# Patient Record
Sex: FEMALE | Race: WHITE | NOT HISPANIC OR LATINO | Employment: OTHER | URBAN - METROPOLITAN AREA
[De-identification: names, ages, dates, MRNs, and addresses within clinical notes are randomized per-mention and may not be internally consistent; named-entity substitution may affect disease eponyms.]

---

## 2019-10-12 ENCOUNTER — HOSPITAL ENCOUNTER (INPATIENT)
Facility: HOSPITAL | Age: 59
LOS: 4 days | Discharge: NURSING, REHAB OR LONG-TERM CARE | DRG: 064 | End: 2019-10-16
Attending: PSYCHIATRY & NEUROLOGY | Admitting: PSYCHIATRY & NEUROLOGY
Payer: COMMERCIAL

## 2019-10-12 ENCOUNTER — HOSPITAL ENCOUNTER (OUTPATIENT)
Dept: TELEMEDICINE | Facility: HOSPITAL | Age: 59
Discharge: HOME | End: 2019-10-12
Payer: COMMERCIAL

## 2019-10-12 DIAGNOSIS — I63.411 EMBOLIC STROKE INVOLVING RIGHT MIDDLE CEREBRAL ARTERY: ICD-10-CM

## 2019-10-12 DIAGNOSIS — I65.23 INTERNAL CAROTID ARTERY STENOSIS, BILATERAL: ICD-10-CM

## 2019-10-12 DIAGNOSIS — I10 ESSENTIAL HYPERTENSION: ICD-10-CM

## 2019-10-12 DIAGNOSIS — I65.23 BILATERAL CAROTID ARTERY STENOSIS: ICD-10-CM

## 2019-10-12 DIAGNOSIS — I63.9 STROKE: ICD-10-CM

## 2019-10-12 PROBLEM — G93.6 CYTOTOXIC CEREBRAL EDEMA: Status: ACTIVE | Noted: 2019-10-12

## 2019-10-12 PROBLEM — I65.21 INTERNAL CAROTID ARTERY STENOSIS, RIGHT: Status: ACTIVE | Noted: 2019-10-12

## 2019-10-12 PROBLEM — E78.2 MIXED HYPERLIPIDEMIA: Status: ACTIVE | Noted: 2019-10-12

## 2019-10-12 LAB
CHOLEST SERPL-MCNC: 143 MG/DL (ref 120–199)
CHOLEST/HDLC SERPL: 3.7 {RATIO} (ref 2–5)
ESTIMATED AVG GLUCOSE: 192 MG/DL (ref 68–131)
HBA1C MFR BLD HPLC: 8.3 % (ref 4–5.6)
HDLC SERPL-MCNC: 39 MG/DL (ref 40–75)
HDLC SERPL: 27.3 % (ref 20–50)
LDLC SERPL CALC-MCNC: 71 MG/DL (ref 63–159)
NONHDLC SERPL-MCNC: 104 MG/DL
POCT GLUCOSE: 130 MG/DL (ref 70–110)
TRIGL SERPL-MCNC: 165 MG/DL (ref 30–150)
TSH SERPL DL<=0.005 MIU/L-ACNC: 1.36 UIU/ML (ref 0.4–4)

## 2019-10-12 PROCEDURE — 99285 EMERGENCY DEPT VISIT HI MDM: CPT | Mod: 25

## 2019-10-12 PROCEDURE — G0425 INPT/ED TELECONSULT30: HCPCS | Mod: GT,G0,, | Performed by: PSYCHIATRY & NEUROLOGY

## 2019-10-12 PROCEDURE — 82962 GLUCOSE BLOOD TEST: CPT

## 2019-10-12 PROCEDURE — 84443 ASSAY THYROID STIM HORMONE: CPT

## 2019-10-12 PROCEDURE — 99285 EMERGENCY DEPT VISIT HI MDM: CPT | Mod: ,,, | Performed by: EMERGENCY MEDICINE

## 2019-10-12 PROCEDURE — 83036 HEMOGLOBIN GLYCOSYLATED A1C: CPT

## 2019-10-12 PROCEDURE — G0425 PR INPT TELEHEALTH CONSULT 30M: ICD-10-PCS | Mod: GT,G0,, | Performed by: PSYCHIATRY & NEUROLOGY

## 2019-10-12 PROCEDURE — 80061 LIPID PANEL: CPT

## 2019-10-12 PROCEDURE — 81001 URINALYSIS AUTO W/SCOPE: CPT

## 2019-10-12 PROCEDURE — 20600001 HC STEP DOWN PRIVATE ROOM

## 2019-10-12 PROCEDURE — 99285 PR EMERGENCY DEPT VISIT,LEVEL V: ICD-10-PCS | Mod: ,,, | Performed by: EMERGENCY MEDICINE

## 2019-10-12 RX ORDER — CLOPIDOGREL BISULFATE 75 MG/1
75 TABLET ORAL DAILY
Status: DISCONTINUED | OUTPATIENT
Start: 2019-10-13 | End: 2019-10-16 | Stop reason: HOSPADM

## 2019-10-12 RX ORDER — HEPARIN SODIUM 5000 [USP'U]/ML
5000 INJECTION, SOLUTION INTRAVENOUS; SUBCUTANEOUS EVERY 8 HOURS
Status: DISCONTINUED | OUTPATIENT
Start: 2019-10-13 | End: 2019-10-16 | Stop reason: HOSPADM

## 2019-10-12 RX ORDER — ASPIRIN 81 MG/1
81 TABLET ORAL DAILY
Status: DISCONTINUED | OUTPATIENT
Start: 2019-10-13 | End: 2019-10-16 | Stop reason: HOSPADM

## 2019-10-12 RX ORDER — SODIUM CHLORIDE 0.9 % (FLUSH) 0.9 %
10 SYRINGE (ML) INJECTION
Status: DISCONTINUED | OUTPATIENT
Start: 2019-10-12 | End: 2019-10-16 | Stop reason: HOSPADM

## 2019-10-12 RX ORDER — ATORVASTATIN CALCIUM 20 MG/1
40 TABLET, FILM COATED ORAL DAILY
Status: DISCONTINUED | OUTPATIENT
Start: 2019-10-13 | End: 2019-10-16 | Stop reason: HOSPADM

## 2019-10-12 RX ORDER — LABETALOL HCL 20 MG/4 ML
10 SYRINGE (ML) INTRAVENOUS
Status: DISCONTINUED | OUTPATIENT
Start: 2019-10-12 | End: 2019-10-16 | Stop reason: HOSPADM

## 2019-10-12 NOTE — SUBJECTIVE & OBJECTIVE
Woke up with symptoms?: no    Recent bleeding noted: no  Does the patient take any Blood Thinners? no  Medications: Antiplatelets:  aspirin      Past Medical History: hypertension, MI/CAD, CHF and asthma    Past Surgical History: no major surgeries within the last 2 weeks    Family History: no relevant history    Social History: no smoking, no drinking, no drugs    Allergies: Allergies have not been reviewed No known drug allergies    Review of Systems   Constitutional: Negative for chills, diaphoresis and fever.   HENT: Negative for hearing loss, tinnitus and trouble swallowing.    Eyes: Negative for visual disturbance.   Respiratory: Negative for shortness of breath.    Cardiovascular: Negative for chest pain and palpitations.   Gastrointestinal: Negative for vomiting.   Endocrine: Negative for cold intolerance.   Genitourinary: Negative for hematuria.   Musculoskeletal: Negative for neck pain and neck stiffness.   Allergic/Immunologic: Negative for immunocompromised state.   Neurological: Positive for facial asymmetry, speech difficulty, weakness and numbness. Negative for dizziness and headaches.   Psychiatric/Behavioral: Negative for agitation, behavioral problems and confusion.     Objective:   Vitals: There were no vitals taken for this visit. BP: 139/70, Respiratory Rate: 17 and Heart Rate: 78    CT READ: Yes  No hemmorhage. No mass effect. No early infarct signs.     Physical Exam   Constitutional: She is oriented to person, place, and time. She appears well-developed and well-nourished.   HENT:   Head: Normocephalic and atraumatic.   Eyes: Pupils are equal, round, and reactive to light.   R gaze     Neck: Normal range of motion. Neck supple.   Cardiovascular: Normal rate and regular rhythm.   Pulmonary/Chest: No respiratory distress.   Abdominal: She exhibits no mass.   Genitourinary: Rectal exam shows guaiac negative stool.   Musculoskeletal: She exhibits no edema or deformity.   Neurological: She is  alert and oriented to person, place, and time. A cranial nerve deficit and sensory deficit is present. She exhibits abnormal muscle tone.   Skin: No rash noted. She is not diaphoretic. No erythema.   Psychiatric: She has a normal mood and affect. Her behavior is normal.   Nursing note and vitals reviewed.

## 2019-10-12 NOTE — CONSULTS
[facility]  Vascular Neurology  Comprehensive Stroke Center  Tele-Consultation Note      Consults    Consulting Provider: ED MOON  Current Providers  No providers found    Patient Location: [facility] Emergency Department  Spoke hospital nurse at bedside with patient assisting consultant.     Patient information was obtained from patient, spouse/SO and ED MD.         Assessment/Plan:    60 y/o with HTN, CAD, CHF, asthma, presents with acute onset L sided weakness, Lface droop, slurred speech, and R gaze.  NIHSS 13, CTH without acute abnormality.  R MCA syndrome, out of the window for treatment with iv alteplase. Recommended STAT CTA head and neck searching for LVO and eligibility for endovascular intervention.  Transfer to main [place] if CTA confirms LVO. Otherwise, admit and complete stroke wrk up.  Neurology, PT, OT, and speech consults.        STROKE DOCUMENTATION     Acute Stroke Times:   Acute Stroke Times   Last Known Normal Date: 10/12/19  Last Known Normal Time: 0730  Symptom Onset Date: 10/12/19  Symptom Onset Time: (unclear)  Stroke Team Called Date: 10/12/19  Stroke Team Called Time: 1418  Stroke Team Arrival Date: 10/12/19  Stroke Team Arrival Time: 1430  CT Interpretation Time: 1430  Decision to Treat Time for Alteplase: (No iv alteplase)  Decision to Treat Time for IR: 1434    NIH Scale:  Interval: baseline  1a. Level of Consciousness: 0-->Alert, keenly responsive  1b. LOC Questions: 0-->Answers both questions correctly  1c. LOC Commands: 0-->Performs both tasks correctly  2. Best Gaze: 2-->Forced deviation, or total gaze paresis not overcome by the oculocephalic maneuver  3. Visual: 0-->No visual loss  4. Facial Palsy: 2-->Partial paralysis (total or near-total paralysis of lower face)  5a. Motor Arm, Left: 0-->No drift, limb holds 90 (or 45) degrees for full 10 secs  5b. Motor Arm, Right: 4-->No  movement  6a. Motor Leg, Left: 0-->No drift, leg holds 30 degree position for full 5 secs  6b. Motor Leg, Right: 3-->No effort against gravity, leg falls to bed immediately  7. Limb Ataxia: 0-->Absent  8. Sensory: 1-->Mild-to-moderate sensory loss, patient feels pinprick is less sharp or is dull on the affected side, or there is a loss of superficial pain with pinprick, but patient is aware of being touched  9. Best Language: 0-->No aphasia, normal  10. Dysarthria: 1-->Mild-to-moderate dysarthria, patient slurs at least some words and, at worst, can be understood with some difficulty  11. Extinction and Inattention (formerly Neglect): 0-->No abnormality  Total (NIH Stroke Scale): 13     Modified [place] Score: 0  [place] Coma Scale:15   ABCD2 Score:    [id number] Score:   HAS -BLED Score:   ICH Score:   Hunt & Prakash Classification:       Diagnoses: R MCA territory infarct.  No new Assessment & Plan notes have been filed under this hospital service since the last note was generated.  Service: Vascular Neurology      There were no vitals taken for this visit.  Alteplase Eligible?: No  Alteplase Recommendation: Alteplase not recommended due to Outside of treatment window   Possible Interventional Revascularization Candidate? Yes    Disposition Recommendation: [place] [facility] [place] brain + LVO    Subjective:     History of Present Illness: 60 y/o with HTN, CAD, CHF, asthma, presents with acute onset L sided weakness, Lface droop, slurred speech, and R gaze. Never had similar symptoms before.  Patient was LKW at 730 am and found with the aforementioned symptoms at 11 am.  No improving.  No notes on file      Woke up with symptoms?: no    Recent bleeding noted: no  Does the patient take any Blood Thinners? no  Medications: Antiplatelets:  aspirin      Past Medical History: hypertension, MI/CAD, CHF and asthma    Past Surgical History: no major surgeries within the last 2 weeks    Family History: no relevant  history    Social History: no smoking, no drinking, no drugs    Allergies: Allergies have not been reviewed No known drug allergies    Review of Systems   Constitutional: Negative for chills, diaphoresis and fever.   HENT: Negative for hearing loss, tinnitus and trouble swallowing.    Eyes: Negative for visual disturbance.   Respiratory: Negative for shortness of breath.    Cardiovascular: Negative for chest pain and palpitations.   Gastrointestinal: Negative for vomiting.   Endocrine: Negative for cold intolerance.   Genitourinary: Negative for hematuria.   Musculoskeletal: Negative for neck pain and neck stiffness.   Allergic/Immunologic: Negative for immunocompromised state.   Neurological: Positive for facial asymmetry, speech difficulty, weakness and numbness. Negative for dizziness and headaches.   Psychiatric/Behavioral: Negative for agitation, behavioral problems and confusion.     Objective:   Vitals: There were no vitals taken for this visit. BP: 139/70, Respiratory Rate: 17 and Heart Rate: 78    CT READ: Yes  No hemmorhage. No mass effect. No early infarct signs.     Physical Exam   Constitutional: She is oriented to person, place, and time. She appears well-developed and well-nourished.   HENT:   Head: Normocephalic and atraumatic.   Eyes: Pupils are equal, round, and reactive to light.   R gaze     Neck: Normal range of motion. Neck supple.   Cardiovascular: Normal rate and regular rhythm.   Pulmonary/Chest: No respiratory distress.   Abdominal: She exhibits no mass.   Genitourinary: Rectal exam shows guaiac negative stool.   Musculoskeletal: She exhibits no edema or deformity.   Neurological: She is alert and oriented to person, place, and time. A cranial nerve deficit and sensory deficit is present. She exhibits abnormal muscle tone.   Skin: No rash noted. She is not diaphoretic. No erythema.   Psychiatric: She has a normal mood and affect. Her behavior is normal.   Nursing note and vitals  reviewed.            Recommended the emergency room physician to have a brief discussion with the patient and/or family if available regarding the risks and benefits of treatment, and to briefly document the occurrence of that discussion in his clinical encounter note.     The attending portion of this evaluation, treatment, and documentation was performed per Rd Badillo MD via audiovisual.    Billing code:  (moderate to severe stroke, large areas of edema, some mimics)    · This patient has a critical neurological condition/illness, with high morbidity and mortality.  · There is a high probability for acute neurological change leading to clinical and possibly life-threatening deterioration requiring highest level of physician preparedness for urgent intervention.  · Care was coordinated with other physicians involved in the patient's care.  · Radiologic studies and laboratory data were reviewed and interpreted, and plan of care was re-assessed based on the results.  · Diagnosis, treatment options and prognosis may have been discussed with the patient and/or family members or caregiver.  · Further advanced medical management and further evaluation is warranted for his care.      In your opinion, this was a: Tier 2 Van Negative    Consult End Time: 240PM    Rd Badillo MD  Comprehensive Stroke Center  Vascular Neurology   [facility]

## 2019-10-13 LAB
ALBUMIN SERPL BCP-MCNC: 3.5 G/DL (ref 3.5–5.2)
ALP SERPL-CCNC: 71 U/L (ref 55–135)
ALT SERPL W/O P-5'-P-CCNC: 21 U/L (ref 10–44)
ANION GAP SERPL CALC-SCNC: 14 MMOL/L (ref 8–16)
APTT BLDCRRT: <21 SEC (ref 21–32)
ASCENDING AORTA: 3.49 CM
AST SERPL-CCNC: 27 U/L (ref 10–40)
BACTERIA #/AREA URNS AUTO: ABNORMAL /HPF
BASOPHILS # BLD AUTO: 0.09 K/UL (ref 0–0.2)
BASOPHILS NFR BLD: 0.6 % (ref 0–1.9)
BILIRUB SERPL-MCNC: 0.6 MG/DL (ref 0.1–1)
BILIRUB UR QL STRIP: NEGATIVE
BSA FOR ECHO PROCEDURE: 1.79 M2
BUN SERPL-MCNC: 21 MG/DL (ref 6–20)
CALCIUM SERPL-MCNC: 9.4 MG/DL (ref 8.7–10.5)
CHLORIDE SERPL-SCNC: 102 MMOL/L (ref 95–110)
CK MB SERPL-MCNC: 2.7 NG/ML (ref 0.1–6.5)
CK MB SERPL-RTO: 1.8 % (ref 0–5)
CK SERPL-CCNC: 151 U/L (ref 20–180)
CLARITY UR REFRACT.AUTO: CLEAR
CO2 SERPL-SCNC: 26 MMOL/L (ref 23–29)
COLOR UR AUTO: YELLOW
CREAT SERPL-MCNC: 0.8 MG/DL (ref 0.5–1.4)
CV ECHO LV RWT: 0.4 CM
DIFFERENTIAL METHOD: ABNORMAL
DOP CALC LVOT AREA: 2.7 CM2
DOP CALC LVOT DIAMETER: 1.85 CM
DOP CALC LVOT PEAK VEL: 0.87 M/S
DOP CALC LVOT STROKE VOLUME: 37.83 CM3
DOP CALCLVOT PEAK VEL VTI: 14.08 CM
E WAVE DECELERATION TIME: 99.37 MSEC
E/A RATIO: 0.65
E/E' RATIO: 10.67 M/S
ECHO LV POSTERIOR WALL: 0.84 CM (ref 0.6–1.1)
EOSINOPHIL # BLD AUTO: 0.2 K/UL (ref 0–0.5)
EOSINOPHIL NFR BLD: 1.3 % (ref 0–8)
ERYTHROCYTE [DISTWIDTH] IN BLOOD BY AUTOMATED COUNT: 15.9 % (ref 11.5–14.5)
EST. GFR  (AFRICAN AMERICAN): >60 ML/MIN/1.73 M^2
EST. GFR  (NON AFRICAN AMERICAN): >60 ML/MIN/1.73 M^2
FRACTIONAL SHORTENING: 25 % (ref 28–44)
GLUCOSE SERPL-MCNC: 133 MG/DL (ref 70–110)
GLUCOSE UR QL STRIP: NEGATIVE
HCT VFR BLD AUTO: 41.3 % (ref 37–48.5)
HGB BLD-MCNC: 13.3 G/DL (ref 12–16)
HGB UR QL STRIP: NEGATIVE
IMM GRANULOCYTES # BLD AUTO: 0.1 K/UL (ref 0–0.04)
IMM GRANULOCYTES NFR BLD AUTO: 0.7 % (ref 0–0.5)
INR PPP: 1 (ref 0.8–1.2)
INTERVENTRICULAR SEPTUM: 0.75 CM (ref 0.6–1.1)
IVRT: 0.11 MSEC
KETONES UR QL STRIP: NEGATIVE
LA MAJOR: 5.68 CM
LA MINOR: 4.95 CM
LA WIDTH: 4.25 CM
LEFT ATRIUM SIZE: 3.68 CM
LEFT ATRIUM VOLUME INDEX: 39.7 ML/M2
LEFT ATRIUM VOLUME: 70.32 CM3
LEFT INTERNAL DIMENSION IN SYSTOLE: 3.17 CM (ref 2.1–4)
LEFT VENTRICLE DIASTOLIC VOLUME INDEX: 44.69 ML/M2
LEFT VENTRICLE DIASTOLIC VOLUME: 79.11 ML
LEFT VENTRICLE MASS INDEX: 57 G/M2
LEFT VENTRICLE SYSTOLIC VOLUME INDEX: 22.7 ML/M2
LEFT VENTRICLE SYSTOLIC VOLUME: 40.18 ML
LEFT VENTRICULAR INTERNAL DIMENSION IN DIASTOLE: 4.21 CM (ref 3.5–6)
LEFT VENTRICULAR MASS: 100.85 G
LEUKOCYTE ESTERASE UR QL STRIP: ABNORMAL
LV LATERAL E/E' RATIO: 9.14 M/S
LV SEPTAL E/E' RATIO: 12.8 M/S
LYMPHOCYTES # BLD AUTO: 2.5 K/UL (ref 1–4.8)
LYMPHOCYTES NFR BLD: 17.8 % (ref 18–48)
MAGNESIUM SERPL-MCNC: 1.6 MG/DL (ref 1.6–2.6)
MCH RBC QN AUTO: 28.4 PG (ref 27–31)
MCHC RBC AUTO-ENTMCNC: 32.2 G/DL (ref 32–36)
MCV RBC AUTO: 88 FL (ref 82–98)
MICROSCOPIC COMMENT: ABNORMAL
MONOCYTES # BLD AUTO: 0.9 K/UL (ref 0.3–1)
MONOCYTES NFR BLD: 6.5 % (ref 4–15)
MV PEAK A VEL: 0.99 M/S
MV PEAK E VEL: 0.64 M/S
NEUTROPHILS # BLD AUTO: 10.3 K/UL (ref 1.8–7.7)
NEUTROPHILS NFR BLD: 73.1 % (ref 38–73)
NITRITE UR QL STRIP: NEGATIVE
NRBC BLD-RTO: 0 /100 WBC
PH UR STRIP: 5 [PH] (ref 5–8)
PHOSPHATE SERPL-MCNC: 2.8 MG/DL (ref 2.7–4.5)
PISA TR MAX VEL: 2 M/S
PLATELET # BLD AUTO: 250 K/UL (ref 150–350)
PMV BLD AUTO: 11.2 FL (ref 9.2–12.9)
POTASSIUM SERPL-SCNC: 3.7 MMOL/L (ref 3.5–5.1)
PROT SERPL-MCNC: 6.8 G/DL (ref 6–8.4)
PROT UR QL STRIP: NEGATIVE
PROTHROMBIN TIME: 10 SEC (ref 9–12.5)
PULM VEIN S/D RATIO: 2.25
PV PEAK D VEL: 0.32 M/S
PV PEAK S VEL: 0.72 M/S
RA MAJOR: 4.74 CM
RA WIDTH: 2.66 CM
RBC # BLD AUTO: 4.68 M/UL (ref 4–5.4)
RBC #/AREA URNS AUTO: 2 /HPF (ref 0–4)
RIGHT VENTRICULAR END-DIASTOLIC DIMENSION: 3.45 CM
SINUS: 3.19 CM
SODIUM SERPL-SCNC: 142 MMOL/L (ref 136–145)
SP GR UR STRIP: >=1.03 (ref 1–1.03)
SQUAMOUS #/AREA URNS AUTO: 2 /HPF
STJ: 3.06 CM
TDI LATERAL: 0.07 M/S
TDI SEPTAL: 0.05 M/S
TDI: 0.06 M/S
TR MAX PG: 16 MMHG
TRICUSPID ANNULAR PLANE SYSTOLIC EXCURSION: 1.78 CM
TROPONIN I SERPL DL<=0.01 NG/ML-MCNC: <0.006 NG/ML (ref 0–0.03)
URN SPEC COLLECT METH UR: ABNORMAL
WBC # BLD AUTO: 14.14 K/UL (ref 3.9–12.7)
WBC #/AREA URNS AUTO: 7 /HPF (ref 0–5)

## 2019-10-13 PROCEDURE — 36415 COLL VENOUS BLD VENIPUNCTURE: CPT

## 2019-10-13 PROCEDURE — 99223 1ST HOSP IP/OBS HIGH 75: CPT | Mod: ,,, | Performed by: PSYCHIATRY & NEUROLOGY

## 2019-10-13 PROCEDURE — 80053 COMPREHEN METABOLIC PANEL: CPT

## 2019-10-13 PROCEDURE — 63600175 PHARM REV CODE 636 W HCPCS: Performed by: NURSE PRACTITIONER

## 2019-10-13 PROCEDURE — 85025 COMPLETE CBC W/AUTO DIFF WBC: CPT

## 2019-10-13 PROCEDURE — 82550 ASSAY OF CK (CPK): CPT

## 2019-10-13 PROCEDURE — 97112 NEUROMUSCULAR REEDUCATION: CPT

## 2019-10-13 PROCEDURE — 92610 EVALUATE SWALLOWING FUNCTION: CPT

## 2019-10-13 PROCEDURE — 97161 PT EVAL LOW COMPLEX 20 MIN: CPT

## 2019-10-13 PROCEDURE — 83735 ASSAY OF MAGNESIUM: CPT

## 2019-10-13 PROCEDURE — 97116 GAIT TRAINING THERAPY: CPT

## 2019-10-13 PROCEDURE — 82553 CREATINE MB FRACTION: CPT

## 2019-10-13 PROCEDURE — 97166 OT EVAL MOD COMPLEX 45 MIN: CPT

## 2019-10-13 PROCEDURE — 20600001 HC STEP DOWN PRIVATE ROOM

## 2019-10-13 PROCEDURE — 84484 ASSAY OF TROPONIN QUANT: CPT

## 2019-10-13 PROCEDURE — 85610 PROTHROMBIN TIME: CPT

## 2019-10-13 PROCEDURE — 25000003 PHARM REV CODE 250: Performed by: NURSE PRACTITIONER

## 2019-10-13 PROCEDURE — 97530 THERAPEUTIC ACTIVITIES: CPT

## 2019-10-13 PROCEDURE — 84100 ASSAY OF PHOSPHORUS: CPT

## 2019-10-13 PROCEDURE — 85730 THROMBOPLASTIN TIME PARTIAL: CPT

## 2019-10-13 PROCEDURE — 99223 PR INITIAL HOSPITAL CARE,LEVL III: ICD-10-PCS | Mod: ,,, | Performed by: PSYCHIATRY & NEUROLOGY

## 2019-10-13 RX ORDER — PANTOPRAZOLE SODIUM 40 MG/1
40 TABLET, DELAYED RELEASE ORAL DAILY
Status: DISCONTINUED | OUTPATIENT
Start: 2019-10-13 | End: 2019-10-16 | Stop reason: HOSPADM

## 2019-10-13 RX ORDER — ACETAMINOPHEN 325 MG/1
650 TABLET ORAL EVERY 6 HOURS PRN
Status: DISCONTINUED | OUTPATIENT
Start: 2019-10-13 | End: 2019-10-16 | Stop reason: HOSPADM

## 2019-10-13 RX ADMIN — ACETAMINOPHEN 650 MG: 325 TABLET ORAL at 06:10

## 2019-10-13 RX ADMIN — ATORVASTATIN CALCIUM 40 MG: 20 TABLET, FILM COATED ORAL at 08:10

## 2019-10-13 RX ADMIN — CLOPIDOGREL BISULFATE 75 MG: 75 TABLET ORAL at 08:10

## 2019-10-13 RX ADMIN — HEPARIN SODIUM 5000 UNITS: 5000 INJECTION, SOLUTION INTRAVENOUS; SUBCUTANEOUS at 09:10

## 2019-10-13 RX ADMIN — ACETAMINOPHEN 650 MG: 325 TABLET ORAL at 07:10

## 2019-10-13 RX ADMIN — HEPARIN SODIUM 5000 UNITS: 5000 INJECTION, SOLUTION INTRAVENOUS; SUBCUTANEOUS at 06:10

## 2019-10-13 RX ADMIN — ASPIRIN 81 MG: 81 TABLET, COATED ORAL at 08:10

## 2019-10-13 RX ADMIN — PANTOPRAZOLE SODIUM 40 MG: 40 TABLET, DELAYED RELEASE ORAL at 03:10

## 2019-10-13 RX ADMIN — HEPARIN SODIUM 5000 UNITS: 5000 INJECTION, SOLUTION INTRAVENOUS; SUBCUTANEOUS at 02:10

## 2019-10-13 NOTE — PROGRESS NOTES
[facility]  Vascular Neurology  Comprehensive Stroke Center  Progress Note    Assessment/Plan:     * Embolic stroke involving right middle cerebral artery  59 year old female with presented to [facility] with acute onset of left sided weakness, slurred speech, and left facial droop this morning. LKN @ 0730 AM. Symptoms were noticed by family around 1100 AM. On arrival to [facility] telestroke completed. CTA completed which did not reveal an LVO but high grade R ICA stenosis. MRA shows occlusion of mid R M1. No interventions, out of window. Possible etiology PAOLA. VAS US carotid bilateral pending.    Antithrombotics for secondary stroke prevention: Antiplatelets: Aspirin: 81 mg daily  Clopidogrel: 75 mg daily    Statins for secondary stroke prevention and hyperlipidemia, if present:   Statins: Atorvastatin- 40 mg daily    Aggressive risk factor modification: HTN, HLD, Diet, Exercise     Rehab efforts: The patient has been evaluated by a stroke team provider and the therapy needs have been fully considered based off the presenting complaints and exam findings. The following therapy evaluations are needed: PT evaluate and treat, OT evaluate and treat, SLP evaluate and treat, PM&R evaluate for appropriate placement - rehab    Diagnostics ordered/pending: Carotid ultrasound to assess vasculature    VTE prophylaxis: Heparin 5000 units SQ every 8 hours and mechanical SCDs    BP parameters: Infarct: No intervention, SBP <220        Internal carotid artery stenosis, right  CTA at outside facility demonstrating R ICA high grade stenosis   DAPT + Statin   VAS US carotid bilateral ordered/pending  May consult vascular surgery       Cytotoxic cerebral edema  Area of cytotoxic cerebral edema identified when reviewing brain imaging in the territory of the R middle cerebral artery. There is not mass effect associated with it. We will continue to monitor the patients clinical exam for any worsening of symptoms which may  indicate expansion of the stroke or the area of the edema resulting in the clinical change. The pattern is suggestive of PAOLA etiology         Mixed hyperlipidemia  Stroke risk factor  LDL 71   Atorvastatin 40 mg     Essential hypertension  Stroke risk factor  SBP <220           10/12 - R MCA stroke. CTA at outside hospital with R ICA stenosis. Transferred for higher level of care.   10/13 - MRI/MRA with R M1 occlusion, no interventions out of window. Exam improved, NIH from 12 to 6. US carotids ordered. UA WBCs 7, pt denies symptoms of UTI, a febrile. SLP cleared for mechanical soft diet. Therapy recommending rehab.     STROKE DOCUMENTATION   Acute Stroke Times   Last Known Normal Date: 10/12/19  Last Known Normal Time: 0730  Symptom Onset Date: 10/12/19  Symptom Onset Time: 0730  Stroke Team Called Date: 10/12/19  Stroke Team Called Time: 1930  Stroke Team Arrival Date: 10/12/19  Stroke Team Arrival Time: 1935  CT Interpretation Time: (NA)  Decision to Treat Time for Alteplase: (NA)  Decision to Treat Time for IR: (NA)    NIH Scale:  1a. Level of Consciousness: 0-->Alert, keenly responsive  1b. LOC Questions: 0-->Answers both questions correctly  1c. LOC Commands: 0-->Performs both tasks correctly  2. Best Gaze: 0-->Normal  3. Visual: 0-->No visual loss  4. Facial Palsy: 2-->Partial paralysis (total or near-total paralysis of lower face)  5a. Motor Arm, Left: 2-->Some effort against gravity, limb cannot get to or maintain (if cued) 90 (or 45) degrees, drifts down to bed, but has some effort against gravity  5b. Motor Arm, Right: 0-->No drift, limb holds 90 (or 45) degrees for full 10 secs  6a. Motor Leg, Left: 1-->Drift, leg falls by the end of the 5-sec period but does not hit bed  6b. Motor Leg, Right: 0-->No drift, leg holds 30 degree position for full 5 secs  7. Limb Ataxia: 0-->Absent  8. Sensory: 0-->Normal, no sensory loss  9. Best Language: 0-->No aphasia, normal  10. Dysarthria: 1-->Mild-to-moderate  dysarthria, patient slurs at least some words and, at worst, can be understood with some difficulty  11. Extinction and Inattention (formerly Neglect): 0-->No abnormality  Total (NIH Stroke Scale): 6       Modified [place] Score: 1  [place] Coma Scale:    ABCD2 Score:    [id number] Score:   HAS -BLED Score:   ICH Score:   Hunt & Prakash Classification:      Hemorrhagic change of an Ischemic Stroke: Does this patient have an ischemic stroke with hemorrhagic changes? No     Neurologic Chief Complaint: Left side weakness, slurred speech, L facial droop    Subjective:     Interval History: Patient is seen for follow-up neurological assessment and treatment recommendations: MRI/MRA with R M1 occlusion, no interventions out of window. Exam improved, NIH from 12 to 6. US carotids ordered. UA WBCs 7, pt denies symptoms of UTI, a febrile. SLP cleared for mechanical soft diet. Therapy recommending rehab.     HPI, Past Medical, Family, and Social History remains the same as documented in the initial encounter.     Review of Systems   Constitutional: Negative for chills and fever.   HENT: Negative for trouble swallowing.    Eyes: Negative for visual disturbance.   Respiratory: Negative for shortness of breath.    Gastrointestinal: Negative for diarrhea, nausea and vomiting.   Genitourinary: Negative for difficulty urinating and dysuria.   Neurological: Positive for facial asymmetry, speech difficulty and weakness. Negative for numbness and headaches.   Psychiatric/Behavioral: Negative for confusion.     Scheduled Meds:   aspirin  81 mg Oral Daily    atorvastatin  40 mg Oral Daily    clopidogrel  75 mg Oral Daily    heparin (porcine)  5,000 Units Subcutaneous Q8H    pantoprazole  40 mg Oral Daily     Continuous Infusions:   sodium chloride 0.9%       PRN Meds:acetaminophen, labetalol, sodium chloride 0.9%, sodium chloride 0.9%    Objective:     Vital Signs (Most Recent):  Temp: 97.5 °F (36.4 °C) (10/13/19 1153)  Pulse: 97  (10/13/19 1504)  Resp: 18 (10/13/19 1153)  BP: 130/79 (10/13/19 1153)  SpO2: (!) 94 % (10/13/19 1153)  BP Location: Left arm    Vital Signs Range (Last 24H):  Temp:  [97.5 °F (36.4 °C)-98.7 °F (37.1 °C)]   Pulse:  []   Resp:  [16-20]   BP: (102-130)/(61-79)   SpO2:  [94 %-97 %]   BP Location: Left arm    Physical Exam   Constitutional: She is oriented to person, place, and time. She appears well-developed.   HENT:   Head: Normocephalic.   Eyes: EOM are normal.   Cardiovascular: Normal rate.   Pulmonary/Chest: Effort normal.   Abdominal: Soft.   Neurological: She is alert and oriented to person, place, and time.   Skin: Skin is warm and dry.   Psychiatric: She has a normal mood and affect.   Vitals reviewed.      Neurological Exam:   LOC: alert  Attention Span: Good   Language: No aphasia  Articulation: Dysarthria  Orientation: Person, Place, Time   Visual Fields: Full  EOM (CN III, IV, VI): Full/intact  Facial Movement (CN VII): Lower facial weakness on the Left  Motor: Arm left  Paresis: 3/5  Leg left  Paresis: 4/5  Arm right  Normal 5/5  Leg right Normal 5/5  Sensation: Intact to light touch, temperature and vibration  Tone: Normal tone throughout    Laboratory:  CMP:   Recent Labs   Lab 10/13/19  0623   CALCIUM 9.4   ALBUMIN 3.5   PROT 6.8      K 3.7   CO2 26      BUN 21*   CREATININE 0.8   ALKPHOS 71   ALT 21   AST 27   BILITOT 0.6     BMP:   Recent Labs   Lab 10/13/19  0623      K 3.7      CO2 26   BUN 21*   CREATININE 0.8   CALCIUM 9.4       Diagnostic Results     Brain imaging:   MRI brain 10/13/19  Acute right MCA distribution infarction predominantly involving the right basal ganglia.  No evidence of hemorrhagic conversion.    Partially absent right ICA and MCA flow voids likely representing vascular occlusions corresponding to abnormal findings on MRA reported separately.      CT head 10/12/19  Hypoattenuation in a right MCA distribution with loss of gray-white differentiation  and hyperdense MCA sign concerning for acute stroke with right MCA thrombus.    Vessel Imaging:  MRA brain/neck 10/13/19  Marked narrowing of the visualized right internal carotid artery through the skull base with occlusion of the mid right M1 segment of the MCA.    CTA head and neck at outside facility   R ICA stenosis 99%  L ICA stenosis 70%     Cardiac Evaluation:   TTE pending       Joesph Kemp NP  Comprehensive Stroke Center  Department of Vascular Neurology   [facility]

## 2019-10-13 NOTE — HPI
59 year old female with past medical hx of HTN, CAD, CHF, and asthma presents as transfer from [facility] ED with acute left sided weakness, L facial droop, and slurred speech. LNK @ 0730 AM. Symptoms noticed by family at approximately 11:00 AM. Telestroke completed. Recommended CTA head and neck. Imaging was done at outside facility with no noted LVO but patient with significant high grade stenosis of the LICA measuring 99%. Patient transferred to [facility] for higher level of neurological care. All history was obtained per chart review. No family with patient on arrival to ED.

## 2019-10-13 NOTE — PT/OT/SLP EVAL
"Occupational Therapy   Evaluation    Name: Karen Ellison  MRN: [id number]  Admitting Diagnosis:  Embolic stroke involving right middle cerebral artery      Recommendations:     Discharge Recommendations: rehabilitation facility  Discharge Equipment Recommendations:  tub bench  Barriers to discharge:  Inaccessible home environment, Decreased caregiver support    Assessment:     Karen Ellison is a 59 y.o. female with a medical diagnosis of Embolic stroke involving right middle cerebral artery.  She presents with performance deficits affecting function: weakness, impaired sensation, impaired functional mobilty, impaired balance, impaired cognition, decreased upper extremity function, decreased lower extremity function, decreased coordination, gait instability, impaired endurance, impaired self care skills, impaired fine motor, impaired coordination, decreased safety awareness, abnormal tone.      Rehab Prognosis: Good; patient would benefit from acute skilled OT services to address these deficits and reach maximum level of function.       Plan:     Patient to be seen 4 x/week to address the above listed problems via self-care/home management, therapeutic activities, therapeutic exercises, neuromuscular re-education, therapeutic groups, cognitive retraining, sensory integration  · Plan of Care Expires: 11/10/19  · Plan of Care Reviewed with: patient, family    Subjective     Patient:  "I had double vision and blurry vision the day before the stroke, not now."  "I got up to go to the bathroom and I just collapsed.  I slid to the floor and couldn't get up."     Occupational Profile:  Patient resides in [place], [place] with her  in a one story home with 17 steps to enter, bilateral rail.  Patient is right handed.  PTA patient independent with ADLs including driving.  Works: housekeeping.  Hobbies: reading, cross-stitching, travelling, caring for her 2 dogs.      Pain/Comfort:  · Pain Rating 1: 0/10  · Pain Rating " Post-Intervention 1: 0/10    Patients cultural, spiritual, Mormon conflicts given the current situation: no    Objective:     Communicated with: Nurse prior to session.  Patient found supine with telemetry, SCD upon OT entry to room.  Family present.     General Precautions: Standard, aspiration, fall   Orthopedic Precautions:N/A   Braces: N/A     Occupational Performance:    Bed Mobility:    · Patient completed Rolling/Turning to Left with  supervision  · Patient completed Rolling/Turning to Right with moderate assistance  · Patient completed Scooting/Bridging with minimum assistance  · Patient completed Supine to Sit with minimum assistance  · Patient completed Sit to Supine with minimum assistance    Functional Mobility/Transfers:  · Patient completed Sit <> Stand Transfer with minimum assistance  with  no assistive device   · Patient completed Bed <> Chair Transfer using Stand Pivot technique with moderate assistance with no assistive device    Activities of Daily Living:  · Grooming: moderate assistance while standing  · Upper Body Dressing: moderate assistance while seated EOB  · Lower Body Dressing: moderate assistance while standing     Cognitive/Visual Perceptual:  Cognitive/Psychosocial Skills:     -       Oriented to: Person, Place, Time and Situation   -       Follows Commands/attention:Follows one-step commands  -       Communication: dysarthria  -       Safety awareness/insight to disability: impaired   -       Mood/Affect/Coping skills/emotional control: Appropriate to situation and Cooperative  Visual/Perceptual:      -Intact      Physical Exam:  Postural examination/scapula alignment:    -       Rounded shoulders  Skin integrity: Visible skin intact  Edema:  None noted  Sensation:    -       Intact  Upper Extremity Range of Motion:     -       Right Upper Extremity: WNL  -       Left Upper Extremity: AAROM WNL  Upper Extremity Strength:    -       Right Upper Extremity: WNL  -       Left Upper  Extremity: 2/5 shoulder/ fingers; 3/5 elbow, wrist    AMPAC 6 Click ADL:  AMPAC Total Score: 14    Treatment & Education:  Patient/ Family education provided for stroke warning signs, prevention guidelines and personal risk factors.  Patient/ Family verbalizing understanding via teach back method.    Patient education provided on role of OT and need for rehab upon discharge.  Patient education provided on hemiplegic dressing technique, left UE weight bearing / positioning, postural control, and transfers.  Continued education, patient/ family training recommended.  Patient alert and oriented x 3; able to follow 4/4 one step commands.  Patient attentive and interactive throughout the session.  Patient able to identify 5/5 body parts.  Able to name 5/5 objects.  Able to sequence 7/7 days of the week and 12/12 months of the year.  Addressed left UE weight bearing while seated EOB.  Addressed oral motor ex while seated.  Patient with increased weight shift to the left; able to correct with min verbal cues.  Patient's functional status and disposition recommendation discussed with stroke team in daily rounds.  White board updated in patient's room.  OT asked if there were any other questions; patient/ family had no further questions.   Education:    Patient left supine with all lines intact, call button in reach and bed alarm on    GOALS:   Multidisciplinary Problems     Occupational Therapy Goals        Problem: Occupational Therapy Goal    Goal Priority Disciplines Outcome Interventions   Occupational Therapy Goal     OT, PT/OT     Description:  OT evaluation completed.  Goals set 10/13 to be addressed for 14 days with expiration date, 10/27:  Patient will increase functional independence with ADLs by performing:    Patient will demonstrate rolling to the right with SBA assist.  Not met   Patient will demonstrate rolling to the left with modified independence.   Not met  Patient will demonstrate supine -sit with SBA.    Not met  Patient will demonstrate stand pivot transfers with CGA.   Not met  Patient will demonstrate grooming while standing with CGA.   Not met  Patient will demonstrate upper body dressing with min assist while seated EOB.   Not met  Patient will demonstrate lower body dressing with min assist while seated EOB.   Not met  Patient will demonstrate toileting with min assist.   Not met  Patient will demonstrate bathing while seated EOB with min assist.   Not met  Patient's family / caregiver will demonstrate independence and safety with assisting patient with self-care skills and functional mobility.     Not met  Patient's family / caregiver will demonstrate independence with providing ROM and changes in bed positioning.   Not met  Patient and/or patient's family will verbalize understanding of stroke prevention guidelines, personal risk factors and stroke warning signs via teachback method.  Not met                           History:     Past Medical History:   Diagnosis Date    Coronary artery disease     Hypertension        Past Surgical History:   Procedure Laterality Date    CARDIAC SURGERY         Time Tracking:     OT Date of Treatment: 10/13/19  OT Start Time: 0655  OT Stop Time: 0735  OT Total Time (min): 40 min    Billable Minutes:Evaluation 16  Therapeutic Activity 10  Neuromuscular Re-education 14    MAMTA Sandoval  10/13/2019

## 2019-10-13 NOTE — ED NOTES
Karen Ellison, an 59 y.o. female presents to the ED as transfer form [facility] for [facility] consult. Pt states she woke up with L sided weakness and L facial droop with slurring of speech.       Chief Complaint   Patient presents with    Transfer     MCA stroke from [facility]. No TPA     Review of patient's allergies indicates:  Allergies not on file  Past Medical History:   Diagnosis Date    Coronary artery disease     Hypertension

## 2019-10-13 NOTE — SUBJECTIVE & OBJECTIVE
Neurologic Chief Complaint: Left side weakness, slurred speech, L facial droop    Subjective:     Interval History: Patient is seen for follow-up neurological assessment and treatment recommendations: MRI/MRA with R M1 occlusion, no interventions out of window. Exam improved, NIH from 12 to 6. US carotids ordered. UA WBCs 7, pt denies symptoms of UTI, a febrile. SLP cleared for mechanical soft diet. Therapy recommending rehab.     HPI, Past Medical, Family, and Social History remains the same as documented in the initial encounter.     Review of Systems   Constitutional: Negative for chills and fever.   HENT: Negative for trouble swallowing.    Eyes: Negative for visual disturbance.   Respiratory: Negative for shortness of breath.    Gastrointestinal: Negative for diarrhea, nausea and vomiting.   Genitourinary: Negative for difficulty urinating and dysuria.   Neurological: Positive for facial asymmetry, speech difficulty and weakness. Negative for numbness and headaches.   Psychiatric/Behavioral: Negative for confusion.     Scheduled Meds:   aspirin  81 mg Oral Daily    atorvastatin  40 mg Oral Daily    clopidogrel  75 mg Oral Daily    heparin (porcine)  5,000 Units Subcutaneous Q8H    pantoprazole  40 mg Oral Daily     Continuous Infusions:   sodium chloride 0.9%       PRN Meds:acetaminophen, labetalol, sodium chloride 0.9%, sodium chloride 0.9%    Objective:     Vital Signs (Most Recent):  Temp: 97.5 °F (36.4 °C) (10/13/19 1153)  Pulse: 97 (10/13/19 1504)  Resp: 18 (10/13/19 1153)  BP: 130/79 (10/13/19 1153)  SpO2: (!) 94 % (10/13/19 1153)  BP Location: Left arm    Vital Signs Range (Last 24H):  Temp:  [97.5 °F (36.4 °C)-98.7 °F (37.1 °C)]   Pulse:  []   Resp:  [16-20]   BP: (102-130)/(61-79)   SpO2:  [94 %-97 %]   BP Location: Left arm    Physical Exam   Constitutional: She is oriented to person, place, and time. She appears well-developed.   HENT:   Head: Normocephalic.   Eyes: EOM are normal.    Cardiovascular: Normal rate.   Pulmonary/Chest: Effort normal.   Abdominal: Soft.   Neurological: She is alert and oriented to person, place, and time.   Skin: Skin is warm and dry.   Psychiatric: She has a normal mood and affect.   Vitals reviewed.      Neurological Exam:   LOC: alert  Attention Span: Good   Language: No aphasia  Articulation: Dysarthria  Orientation: Person, Place, Time   Visual Fields: Full  EOM (CN III, IV, VI): Full/intact  Facial Movement (CN VII): Lower facial weakness on the Left  Motor: Arm left  Paresis: 3/5  Leg left  Paresis: 4/5  Arm right  Normal 5/5  Leg right Normal 5/5  Sensation: Intact to light touch, temperature and vibration  Tone: Normal tone throughout    Laboratory:  CMP:   Recent Labs   Lab 10/13/19  0623   CALCIUM 9.4   ALBUMIN 3.5   PROT 6.8      K 3.7   CO2 26      BUN 21*   CREATININE 0.8   ALKPHOS 71   ALT 21   AST 27   BILITOT 0.6     BMP:   Recent Labs   Lab 10/13/19  0623      K 3.7      CO2 26   BUN 21*   CREATININE 0.8   CALCIUM 9.4       Diagnostic Results     Brain imaging:   MRI brain 10/13/19  Acute right MCA distribution infarction predominantly involving the right basal ganglia.  No evidence of hemorrhagic conversion.    Partially absent right ICA and MCA flow voids likely representing vascular occlusions corresponding to abnormal findings on MRA reported separately.      CT head 10/12/19  Hypoattenuation in a right MCA distribution with loss of gray-white differentiation and hyperdense MCA sign concerning for acute stroke with right MCA thrombus.    Vessel Imaging:  MRA brain/neck 10/13/19  Marked narrowing of the visualized right internal carotid artery through the skull base with occlusion of the mid right M1 segment of the MCA.    CTA head and neck at outside facility   R ICA stenosis 99%  L ICA stenosis 70%     Cardiac Evaluation:   TTE pending

## 2019-10-13 NOTE — HOSPITAL COURSE
10/12 - R MCA stroke. CTA at outside hospital with R ICA stenosis. Transferred for higher level of care.   10/13 - MRI/MRA with R M1 occlusion, no interventions out of window. Exam improved, NIH from 12 to 6. US carotids ordered. UA WBCs 7, pt denies symptoms of UTI, a febrile. SLP cleared for mechanical soft diet. Therapy recommending rehab.   10/14: Pt slid off bedside commode yesterday evening, c/o L shoulder pain this AM; XR with no acute process. VAS US Carotids with R ICA occlusion and L ICA stenosis; discussed consult with Vascular Surgery team. Started IVFs to bolster BP for good cerebral perfusion. PRN Tylenol for HA. Dispo rehab.  10/15: Vascular Surgery with no plans for acute intervention; will follow the patient in clinic. Continuing IVFs today but encouraged patient to focus on increasing PO hydration. Ordered PRN Ramelteon per pt request. Plans for d/c to inpatient rehab in the morning.  10/16: Mg 1.5; ordered IV replacement. Pt remains medically and neurologically stable. Plan for d/c to inpatient rehab today.

## 2019-10-13 NOTE — CONSULTS
Food & Nutrition  Education    Diet Education: Stroke Nutrition Therapy  Time Spent: 10mins  Learners: Pt and family members      Nutrition Education provided with handouts: Stroke Nutrition Therapy      Comments: Pt and family members accepted education and verbalized understanding. Discussed low sodium and low cholesterol/fat foods. Pt reports that she eats a fairly low fat diet, will work on low sodium as well. Eats a lot of processed foods. Pt very open to making changes, family members at bedside with positive attitudes. Expect good compliance.       All questions and concerns answered. Dietitian's contact information provided.       Follow-Up:    Please Re-consult as needed        Thanks!

## 2019-10-13 NOTE — ED NOTES
Telemetry Verification   Patient placed on Telemetry Box  Verified with War Room  Box # [id number]   Monitor Tech Barbara   Rate 98   Rhythm nsr

## 2019-10-13 NOTE — PLAN OF CARE
POC reviewed with patient and family, verbalized understanding. Patient aaox3, re-oriented to place. Patient with LSW and left facial droop. Cardiac monitor in place. Patient denied pain at this time. No acute events overnight; fall precautions maintained, bed alarm armed. Call light and personal belongings within reach, patient instructed to call for mobility. CARLITA.

## 2019-10-13 NOTE — PLAN OF CARE
Problem: SLP Goal  Goal: SLP Goal  Description  Speech Language Pathology Goals  Goals expected to be met by 10/21:  1. Pt will tolerate mechanical soft diet and thin liquids while utilizing strategies to manage pocketing and reduce risks of aspiration.  2. Pt will perform OME's x 5-10 to increase oral motor strength, ROM, and function.   3. Pt will participate in speech/language/cognitive evaluation to determine appropriate goals.        Outcome: Ongoing, Progressing    Bedside swallow evaluation completed. Recommending downgrade to mechanical soft diet with strict adherence to use of strategies to managing pocketing in left buccal cavity.  SLC eval to be conducted.    ODALIS Harris, CCC-SLP  Speech Language Pathologist  [phone]  10/13/2019

## 2019-10-13 NOTE — H&P
[facility]  Vascular Neurology  Comprehensive Stroke Center  History & Physical    Consults  Assessment/Plan:     Patient is a 59 y.o. year old female with:    * Embolic stroke involving right middle cerebral artery  59 year old female who presented to [facility] with acute onset of left sided weakness, slurred speech, and left facial droop this morning. LKN @ 0730 AM. Symptoms were noticed by family around 1100 AM. On arrival to [facility] telestroke completed. CTA completed which did not reveal an LVO but high grade R ICA stenosis. Patient to be admitted to Vascular Neurology for stroke work up.     On exam patient with LUE dense hemiparesis, L facial droop, right gaze preference (able to cross midline), and dysarthria. CT head completed on arrival demonstrating hypoattenuation of LMCA.     Antithrombotics for secondary stroke prevention: Antiplatelets: Aspirin: 81 mg daily  Clopidogrel: 75 mg daily    Statins for secondary stroke prevention and hyperlipidemia, if present:   Statins: Atorvastatin- 40 mg daily    Aggressive risk factor modification: HTN, HLD, Diet, Exercise     Rehab efforts: The patient has been evaluated by a stroke team provider and the therapy needs have been fully considered based off the presenting complaints and exam findings. The following therapy evaluations are needed: PT evaluate and treat, OT evaluate and treat, SLP evaluate and treat, PM&R evaluate for appropriate placement    Diagnostics ordered/pending: HgbA1C to assess blood glucose levels, Lipid Profile to assess cholesterol levels, MRI head without contrast to assess brain parenchyma, TTE to assess cardiac function/status , TSH to assess thyroid function    VTE prophylaxis: Heparin 5000 units SQ every 8 hours and mechanical SCDs    BP parameters: Infarct: No intervention, SBP <220        Internal carotid artery stenosis, right  CTA at outside facility demonstrating R ICA high grade stenosis   DAPT + Statin   Will likely  consult vascular surgery       Cytotoxic cerebral edema  Area of cytotoxic cerebral edema identified when reviewing brain imaging in the territory of the R middle cerebral artery. There is not mass effect associated with it. We will continue to monitor the patients clinical exam for any worsening of symptoms which may indicate expansion of the stroke or the area of the edema resulting in the clinical change. The pattern is suggestive of PAOLA etiology         Mixed hyperlipidemia  Stroke risk factor  LDL pending   Atorvastatin 40 mg     Essential hypertension  Stroke risk factor  SBP <220        STROKE DOCUMENTATION     Acute Stroke Times   Last Known Normal Date: 10/12/19  Last Known Normal Time: 0730  Symptom Onset Date: 10/12/19  Symptom Onset Time: 0730  Stroke Team Called Date: 10/12/19  Stroke Team Called Time: 1930  Stroke Team Arrival Date: 10/12/19  Stroke Team Arrival Time: 1935  CT Interpretation Time: (NA)  Decision to Treat Time for Alteplase: (NA)  Decision to Treat Time for IR: (NA)    NIH Scale:  1a. Level of Consciousness: 0-->Alert, keenly responsive  1b. LOC Questions: 0-->Answers both questions correctly  1c. LOC Commands: 0-->Performs both tasks correctly  2. Best Gaze: 2-->Forced deviation, or total gaze paresis not overcome by the oculocephalic maneuver  3. Visual: 0-->No visual loss  4. Facial Palsy: 2-->Partial paralysis (total or near-total paralysis of lower face)  5a. Motor Arm, Left: 4-->No movement  5b. Motor Arm, Right: 0-->No drift, limb holds 90 (or 45) degrees for full 10 secs  6a. Motor Leg, Left: 3-->No effort against gravity, leg falls to bed immediately  6b. Motor Leg, Right: 0-->No drift, leg holds 30 degree position for full 5 secs  7. Limb Ataxia: 0-->Absent  8. Sensory: 0-->Normal, no sensory loss  9. Best Language: 0-->No aphasia, normal  10. Dysarthria: 1-->Mild-to-moderate dysarthria, patient slurs at least some words and, at worst, can be understood with some  difficulty  11. Extinction and Inattention (formerly Neglect): 0-->No abnormality  Total (NIH Stroke Scale): 12     Modified Eduardo Score: 1  [place] Coma Scale:    ABCD2 Score:    [id number] Score:   HAS -BLED Score:   ICH Score:   Hunt & Prakash Classification:      Thrombolysis Candidate? No, Out of window     Delays to Thrombolysis?  No    Interventional Revascularization Candidate?   Is the patient eligible for mechanical endovascular reperfusion (SONA)?  No; No large vessel occlusion    Hemorrhagic change of an Ischemic Stroke: Does this patient have an ischemic stroke with hemorrhagic changes? No         Subjective:     History of Present Illness:  59 year old female with past medical hx of HTN, CAD, CHF, and asthma presents as transfer from [facility] ED with acute left sided weakness, L facial droop, and slurred speech. LNK @ 0730 AM. Symptoms noticed by family at approximately 11:00 AM. Telestroke completed. Recommended CTA head and neck. Imaging was done at outside facility with no noted LVO but patient with significant high grade stenosis of the LICA measuring 99%. Patient transferred to [facility] for higher level of neurological care. All history was obtained per chart review. No family with patient on arrival to ED.         Past Medical History:   Diagnosis Date    Coronary artery disease     Hypertension      Past Surgical History:   Procedure Laterality Date    CARDIAC SURGERY       History reviewed. No pertinent family history.  Social History     Tobacco Use    Smoking status: Never Smoker    Smokeless tobacco: Never Used   Substance Use Topics    Alcohol use: Yes    Drug use: Not on file     Review of patient's allergies indicates:  Allergies not on file    Medications: I have reviewed the current medication administration record.      (Not in a hospital admission)    Review of Systems   Constitutional: Negative for fever.   HENT: Negative for trouble swallowing.    Eyes: Negative for visual  disturbance.   Respiratory: Negative for shortness of breath.    Cardiovascular: Negative for chest pain.   Gastrointestinal: Negative for nausea and vomiting.   Genitourinary: Negative for urgency.   Musculoskeletal: Positive for gait problem.   Skin: Negative for color change.   Neurological: Positive for facial asymmetry, speech difficulty and weakness.   Psychiatric/Behavioral: Negative for agitation and confusion.     Objective:     Vital Signs (Most Recent):  Temp: 98 °F (36.7 °C) (10/12/19 1927)  Pulse: 95 (10/12/19 1927)  Resp: 16 (10/12/19 1927)  BP: 111/67 (10/12/19 1927)  SpO2: 97 % (10/12/19 1927)    Vital Signs Range (Last 24H):  Temp:  [98 °F (36.7 °C)]   Pulse:  [95]   Resp:  [16]   BP: (111)/(67)   SpO2:  [97 %]     Physical Exam   Constitutional: She is oriented to person, place, and time. She appears well-developed.   HENT:   Head: Normocephalic.   Eyes: Pupils are equal, round, and reactive to light.   Cardiovascular: Normal rate.   Pulmonary/Chest: Effort normal.   Abdominal: Soft.   Musculoskeletal:   Left sided hemiparesis    Neurological: She is alert and oriented to person, place, and time.   Skin: Skin is warm. Capillary refill takes less than 2 seconds.   Psychiatric: She has a normal mood and affect.       Neurological Exam:   LOC: alert  Attention Span: Good   Language: No aphasia  Articulation: Dysarthria  Orientation: Person, Place, Time   Visual Fields: R gaze preference but able to cross midline   Pupils (CN II, III): PERRL  Facial Movement (CN VII): Lower facial weakness on the Left  Motor: Arm left  Plegia 0/5  Leg left  Paresis: 2/5  Arm right  Normal 5/5  Leg right Normal 5/5  Cebellar: No evidence of appendicular or axial ataxia  Sensation: Intact to light touch, temperature and vibration      Laboratory:  CMP: No results for input(s): GLUCOSE, CALCIUM, ALBUMIN, PROT, NA, K, CO2, CL, BUN, CREATININE, ALKPHOS, ALT, AST, BILITOT in the last 24 hours.  CBC: No results for input(s):  WBC, RBC, HGB, HCT, PLT, MCV, MCH, MCHC in the last 168 hours.  Lipid Panel: No results for input(s): CHOL, LDLCALC, HDL, TRIG in the last 168 hours.  Coagulation: No results for input(s): PT, INR, APTT in the last 168 hours.  Hgb A1C: No results for input(s): HGBA1C in the last 168 hours.  TSH: No results for input(s): TSH in the last 168 hours.    Diagnostic Results:      Brain imaging:      Vessel Imaging:  CTA head and neck at outside facility   R ICA stenosis 99%  L ICA stenosis 70%    Cardiac Evaluation:   TTE pending         Amara Snow NP  [facility]  Department of Vascular Neurology   [facility]

## 2019-10-13 NOTE — ASSESSMENT & PLAN NOTE
Area of cytotoxic cerebral edema identified when reviewing brain imaging in the territory of the R middle cerebral artery. There is not mass effect associated with it. We will continue to monitor the patients clinical exam for any worsening of symptoms which may indicate expansion of the stroke or the area of the edema resulting in the clinical change. The pattern is suggestive of PAOLA etiology

## 2019-10-13 NOTE — PLAN OF CARE
Patient tolerated evaluation well this morning. Appears cognitively intact, has L facial droop noted. Pt attempting to eat jenaro crackers at start of session, having considerable difficulty with managing/swallowing, nsg aware. L sided weakness, grossly 3-/5 LUE and 4/5 LLE via MMT. Ambulates 12 ft to/from bathroom (total of 24 ft) with 2 person hand-held assist, needing mod assist of both persons to facilitate gait. Poor LLE proprioception and swing phase of gait, often dragging toes on floor; improves with cueing for larger steps, hyperextension at knee noted with stance onto LLE. Would benefit from IP rehab upon discharge; previous independent, has 20 stairs to enter home; family verbalized understanding of PT role, POC, recs for rehab.    Problem: Physical Therapy Goal  Goal: Physical Therapy Goal  Description  Goals to be met by: 10/27/2019     Patient will increase functional independence with mobility by performin. Supine to sit with Stand-by Assistance  2. Sit to supine with Stand-by Assistance - not met  3. Sit to stand transfer with Stand-by Assistance. - not met  4. Bed to chair transfer with Stand-by Assistance using LRAD. - not met  5. Gait  x 100 feet with Contact Guard Assistance using LRAD.   6. Ascend/descend 14 stair with right Handrails Contact Guard Assistance.     Outcome: Ongoing, Progressing    Eric Jones, PT  10/13/2019

## 2019-10-13 NOTE — CONSULTS
Inpatient consult to Physical Medicine Rehab  Consult performed by: Arlene Sands NP  Consult ordered by: Amara Snow NP  Reason for consult: Assess rehab needs      Reviewed patient history and current admission.  Rehab team following.  Full consult to follow.    SHAILA Guajardo, FNP-C  Physical Medicine & Rehabilitation   10/13/2019  Spectralink: [id number]

## 2019-10-13 NOTE — ASSESSMENT & PLAN NOTE
59 year old female with presented to [facility] with acute onset of left sided weakness, slurred speech, and left facial droop this morning. LKN @ 0730 AM. Symptoms were noticed by family around 1100 AM. On arrival to [facility] telestroke completed. CTA completed which did not reveal an LVO but high grade R ICA stenosis. Patient to be admitted to Vascular Neurology for stroke work up.     Antithrombotics for secondary stroke prevention: Antiplatelets: Aspirin: 81 mg daily  Clopidogrel: 75 mg daily    Statins for secondary stroke prevention and hyperlipidemia, if present:   Statins: Atorvastatin- 40 mg daily    Aggressive risk factor modification: HTN, HLD, Diet, Exercise     Rehab efforts: The patient has been evaluated by a stroke team provider and the therapy needs have been fully considered based off the presenting complaints and exam findings. The following therapy evaluations are needed: PT evaluate and treat, OT evaluate and treat, SLP evaluate and treat, PM&R evaluate for appropriate placement    Diagnostics ordered/pending: HgbA1C to assess blood glucose levels, Lipid Profile to assess cholesterol levels, MRI head without contrast to assess brain parenchyma, TTE to assess cardiac function/status , TSH to assess thyroid function    VTE prophylaxis: Heparin 5000 units SQ every 8 hours and mechanical SCDs    BP parameters: Infarct: No intervention, SBP <220

## 2019-10-13 NOTE — PT/OT/SLP EVAL
Speech Language Pathology Evaluation  Bedside Swallow    Patient Name:  Karen Ellison   MRN:  [id number]  Admitting Diagnosis: Embolic stroke involving right middle cerebral artery    Recommendations:                 General Recommendations:  Speech language evaluation, Cognitive-linguistic evaluation and monitor diet tolerance; reinforce use of strategies to manage pocketing  Diet recommendations:  Mechanical soft, Thin   Aspiration Precautions: 1 bite/sip at a time, Alternating bites/sips, Assistance with meals, Avoid talking while eating, Check for pocketing/oral residue - encourage use of finger sweep to clear pocketing , Eliminate distractions, Feed only when awake/alert, Frequent oral care, HOB to 90 degrees, Meds whole 1 at a time, Monitor for s/s of aspiration, Small bites/sips and Strict aspiration precautions   General Precautions: Standard, aspiration, fall  Communication strategies:  go to room if call light pushed    History:     Past Medical History:   Diagnosis Date    Coronary artery disease     Hypertension        Past Surgical History:   Procedure Laterality Date    CARDIAC SURGERY       History of Present Illness:  59 year old female with past medical hx of HTN, CAD, CHF, and asthma presents as transfer from [facility] ED with acute left sided weakness, L facial droop, and slurred speech. LNK @ 0730 AM. Symptoms noticed by family at approximately 11:00 AM. Telestroke completed. Recommended CTA head and neck. Imaging was done at outside facility with no noted LVO but patient with significant high grade stenosis of the LICA measuring 99%. Patient transferred to [facility] for higher level of neurological care. All history was obtained per chart review. No family with patient on arrival to ED.     Prior Intubation HX:  None during this admission    Modified Barium Swallow: none on file    Prior diet: regular consistency diet and thin liquids    Subjective     Pt seen at bedside with 2 family members  present. Pt noted to have recently finished eating lunch meal.  Pocketed material observed in left buccal cavity. Pt also in poor position for PO intake.  SLP and RN assisted pt with repositioning using draw sheet.  Pt instructed to use finger sweep to clear pocketed material.  Stroke team had also notified SLP of pocketing of food prior to SLP's arrival.     Pain/Comfort:  · Pain Rating 1: 0/10    Objective:     Oral Musculature Evaluation  · Oral Musculature: facial asymmetry present, left weakness  · Dentition: (upper and lower partials)  · Secretion Management: left corner drooling(mild leakage)  · Mucosal Quality: adequate  · Mandibular Strength and Mobility: impaired  · Oral Labial Strength and Mobility: impaired retraction, impaired pursing, impaired seal  · Lingual Strength and Mobility: impaired strength  · Buccal Strength and Mobility: decreased tone  · Volitional Cough: strong  · Volitional Swallow: elicited  · Voice Prior to PO Intake: dry, clear    Bedside Swallow Eval:   Consistencies Assessed:  · Thin liquids multiple straw sips   · Puree applesauce x 2  · Solids 1/4 cracker x 1     Oral Phase:   · Pocketing   Left moderate  · Lingual residue    Pharyngeal Phase:   · no overt clinical signs/symptoms of aspiration  · no overt clinical signs/symptoms of pharyngeal dysphagia    Compensatory Strategies  · finger sweep, liquid wash, bite of puree  · Lingual sweep    Treatment: Education provided to pt and family regarding role of SLP, reason for SLP consultation and purpose of swallowing assessment, risks of aspiration a/w pocketing due to left sided oral motor weakness and decreased sensation, diet recommendations, strategies to manage pocketing and reduce risks of aspiration, oral motor exercises, plan to monitor diet tolerance, and plan to initiate speech/language/cognitive evaluation next session.  Understanding was expressed, but pt may required continued reinforcement due to decreased sensory  awareness on left side of oral mechanism. White board updated. Nurse notified of recommendations.     Assessment:     Karen Ellison is a 59 y.o. female with an SLP diagnosis of oral motor weakness with decreased sensation and Dysphagia.  Speech/language/cognitive evaluation pending.     Goals:   Multidisciplinary Problems     SLP Goals        Problem: SLP Goal    Goal Priority Disciplines Outcome   SLP Goal     SLP Ongoing, Progressing   Description:  Speech Language Pathology Goals  Goals expected to be met by 10/21:  1. Pt will tolerate mechanical soft diet and thin liquids while utilizing strategies to manage pocketing and reduce risks of aspiration.  2. Pt will perform OME's x 5-10 to increase oral motor strength, ROM, and function.   3. Pt will participate in speech/language/cognitive evaluation to determine appropriate goals.                         Plan:     · Patient to be seen:  4 x/week   · Plan of Care expires:  11/12/19  · Plan of Care reviewed with:  patient, family   · SLP Follow-Up:  Yes       Discharge recommendations:  rehabilitation facility     Time Tracking:     SLP Treatment Date:   10/13/19  Speech Start Time:  1313  Speech Stop Time:  1330     Speech Total Time (min):  17 min    Billable Minutes: Eval Swallow and Oral Function 17    ODALIS Harris, CCC-SLP  10/13/2019     ODALIS Harris, CCC-SLP  Speech Language Pathologist  [phone]  10/13/2019

## 2019-10-13 NOTE — PT/OT/SLP EVAL
Physical Therapy  Evaluation    Karen Ellison   [id number]    Time Tracking:     PT Received On: 10/13/19   PT Start Time: 0848   PT Stop Time: 0922   PT Total Time (min): 34 min    Billable Minutes: Evaluation 20 and Gait Training 14      Recommendations:     Discharge recommendations: Inpatient Rehabilitation     Equipment recommendations: TBD    Barriers to Discharge: Decreased caregiver support, Inaccessible home environment ([address]) and Patient's functional mobility    Patient Information:     Recent Surgery: * No surgery found *      Diagnosis: Embolic stroke involving right middle cerebral artery    Length of Stay: 1 days    General Precautions: Standard, fall  Orthopedic Precautions: None    Assessment:     Karen Ellison is a 59 y.o. female admitted to [facility] on 10/12/2019 for Embolic stroke involving right middle cerebral artery. Karen Ellison tolerated evaluation fair today. She presents with left sided weakness (UE > LE), decreased functional mobility, balance impairment and gait instability. She required minimum assist for supine to sit due to LUE weakness. She sat edge of bed for ~10 minutes with intermittent contact guard to minimum assist to maintain balance due to left lateral lean. She performed sit to stand with minimum assist and right hand-held support. She ambulated ~20 feet to the bathroom and back with moderate assist x2 and hand-held assist x2 due to buckling of left lower extremity and inconsistent left foot placement due to decreased proprioception. Due to patient's decline in functional mobility, decreased caregiver support and 14 steps to enter house, she would benefit from inpatient rehabilitation to progress towards PLOF prior to returning home. Discussed PT role, POC, goals and recommendations with patient and/or family; verbalized understanding. Karen Ellison would benefit from acute PT services to promote mobility during this admission and improve return to PLOF.    Problem List:  weakness, decreased endurance, impaired self-care skills, impaired mobility, decreased sitting or standing balance, gait instability and decreased coordination    Rehab Prognosis: Excellent; patient would benefit from acute skilled PT services to address these deficits and reach maximum level of function.    Plan:     Patient to be seen 5 x/week to address the above listed problems via gait training, therapeutic activities, therapeutic exercises, neuromuscular re-education    Plan of Care Expires: 11/12/19  Plan of Care reviewed with: patient, daughter, spouse    Subjective:     Communicated with RN prior to evaluation, appropriate to see for evaluation.    Pt found supine in bed (HOB elevated) upon PT entry to room, agreeable to evaluation.    Does this patient have any cultural, spiritual, Oriental orthodox conflicts given the current situation? Patient has no barriers to learning. Patient verbalizes understanding of his/her program and goals and demonstrates them correctly. No cultural, spiritual, or educational needs identified.    Past Medical History:   Diagnosis Date    Coronary artery disease     Hypertension      Past Surgical History:   Procedure Laterality Date    CARDIAC SURGERY         Living Environment:  Patient lives with her spouse in a house with [address]. There is a handrail on the R but it is very wide and hard to  per 's report.    PLOF:  Prior to admission, patient was independent with all functional mobility and ADLs. She was driving and working cleaning houses.    DME:  Patient owns or has access to the following DME: None    Upon discharge, patient will have assistance from spouse, who works at night.    Objective:     Patient found with: telemetry, SCD    Pain:  Pain Rating 1: 0/10  Pain Rating Post-Intervention 1: 0/10    Cognitive Exam:  Patient is oriented to Person, Place, Time and Situation.  Patient follows 100% of single-step commands.    Sensation:   Impaired Left face    Lower  Extremity Range of Motion:  Right Lower Extremity: WNL  Left Lower Extremity: WNL    Lower Extremity Strength:  Right Lower Extremity: WNL  Left Lower Extremity: Deficits: 4/5 grossly    Functional Mobility:    · Bed Mobility:  · Rolling to L: Stand-by assist  · Supine to Sitting: Minimum assist at shoulders for trunk elevation due to LUE weakness  · Sitting to Supine: Contact guard assist  · Scooting towards EOB in sitting: Contact guard assist due to left lateral lean  · Scooting towards HOB in supine: Stand-by assist and cues to bridge with RLE and push through R foot to scoot to HOB    · Transfers:  · Sit to Stand: Sit to stand from edge of bed with minimum assist and right hand-held support x 1 trial(s)  · Stand to Sit: Stand to sit to edge of bed with minimum assist x 1 trial(s)  · Toilet Transfer: Patient transferred on/off toilet with moderate assist and use of grab bar x 1 trial (s)    · Gait:  · Patient ambulated ~20 feet to the bathroom and back with moderate assist x2 and hand held-assist x2 due to buckling of left lower extremity and inconsistent left foot placement due to decreased proprioception. Cues provided for advancement and placement of left lower extremity.  · Assist level: Mod Assist x 2  · Device: Hand-held assist x 2    · Balance:  · Static Sit: Contact-Guard Assist to minimum assist at EOB for ~10 minutes. Demonstrates left lateral lean due to L sided weakness.  · Static Stand: Mod Assist with Hand-held assist x 1    Additional Therapeutic Activity/Exercises:     1. Patient stood at bathroom sink for ~1 minute and used BUEs to wash hands with moderate assist and cues for placement of left lower extremity on the floor.    2. Discussed PT role, POC, goals and recommendations with patient and/or family; verbalized understanding.    3. Whiteboard was updated.    AM-PAC 6 CLICK MOBILITY  Turning over in bed (including adjusting bedclothes, sheets and blankets)?: 3  Sitting down on and standing  up from a chair with arms (e.g., wheelchair, bedside commode, etc.): 3  Moving from lying on back to sitting on the side of the bed?: 3  Moving to and from a bed to a chair (including a wheelchair)?: 2  Need to walk in hospital room?: 2  Climbing 3-5 steps with a railing?: 1  Basic Mobility Total Score: 14    Patient was left supine in bed (HOB elevated) with all lines intact, call button in reach and family present.    Clinical Decision Making for Evaluation Complexity:  1. Body System(s) Examination: 1-2  2. Clinical Presentation: Evolving  3. Evaluation Complexity: Moderate    GOALS:   Multidisciplinary Problems     Physical Therapy Goals        Problem: Physical Therapy Goal    Goal Priority Disciplines Outcome Goal Variances Interventions   Physical Therapy Goal     PT, PT/OT      Description:  Goals to be met by: 10/27/2019     Patient will increase functional independence with mobility by performin. Supine to sit with Stand-by Assistance  2. Sit to supine with Stand-by Assistance - not met  3. Sit to stand transfer with Stand-by Assistance. - not met  4. Bed to chair transfer with Stand-by Assistance using LRAD. - not met  5. Gait  x 100 feet with Contact Guard Assistance using LRAD.   6. Ascend/descend 14 stair with right Handrails Contact Guard Assistance.                      Lidia Yoon, SPT  10/13/2019

## 2019-10-13 NOTE — ASSESSMENT & PLAN NOTE
CTA at outside facility demonstrating R ICA high grade stenosis   DAPT + Statin   Will likely consult vascular surgery

## 2019-10-13 NOTE — ASSESSMENT & PLAN NOTE
59 year old female with presented to [facility] with acute onset of left sided weakness, slurred speech, and left facial droop this morning. LKN @ 0730 AM. Symptoms were noticed by family around 1100 AM. On arrival to [facility] telestroke completed. CTA completed which did not reveal an LVO but high grade R ICA stenosis. MRA shows occlusion of mid R M1. No interventions, out of window. Possible etiology PAOLA. VAS US carotid bilateral pending.    Antithrombotics for secondary stroke prevention: Antiplatelets: Aspirin: 81 mg daily  Clopidogrel: 75 mg daily    Statins for secondary stroke prevention and hyperlipidemia, if present:   Statins: Atorvastatin- 40 mg daily    Aggressive risk factor modification: HTN, HLD, Diet, Exercise     Rehab efforts: The patient has been evaluated by a stroke team provider and the therapy needs have been fully considered based off the presenting complaints and exam findings. The following therapy evaluations are needed: PT evaluate and treat, OT evaluate and treat, SLP evaluate and treat, PM&R evaluate for appropriate placement - rehab    Diagnostics ordered/pending: Carotid ultrasound to assess vasculature    VTE prophylaxis: Heparin 5000 units SQ every 8 hours and mechanical SCDs    BP parameters: Infarct: No intervention, SBP <220

## 2019-10-13 NOTE — ED PROVIDER NOTES
Encounter Date: 10/12/2019       History     Chief Complaint   Patient presents with    Transfer     MCA stroke from [facility]. No TPA     Ms Ellison is a 58 yo female patient with PMHx of CAD, HTN that presents to the ED as a stroke transfer for vascular neurology evaluation from [facility]. Pt presented to [facility] with left sided weakness, slurred speech, and left sided facial droop. Symptoms were noticed by family around 1100 this AM. Last known normal was approximately 0700 this AM. Vascular Neurology at patient's bedside evaluating patient promptly on patient's arrival.         Review of patient's allergies indicates:  No Known Allergies  Past Medical History:   Diagnosis Date    Coronary artery disease     Hypertension      Past Surgical History:   Procedure Laterality Date    CARDIAC SURGERY       History reviewed. No pertinent family history.  Social History     Tobacco Use    Smoking status: Never Smoker    Smokeless tobacco: Never Used   Substance Use Topics    Alcohol use: Yes    Drug use: Not on file     Review of Systems   Constitutional: Negative for chills and fever.   HENT: Negative for congestion, rhinorrhea and sore throat.    Eyes: Negative for pain and visual disturbance.   Respiratory: Negative for cough and shortness of breath.    Cardiovascular: Negative for chest pain.   Gastrointestinal: Negative for abdominal pain, diarrhea, nausea and vomiting.   Genitourinary: Negative for dysuria, flank pain and frequency.   Musculoskeletal: Negative for back pain and neck pain.   Skin: Negative for rash.   Allergic/Immunologic: Negative for immunocompromised state.   Neurological: Positive for facial asymmetry, speech difficulty, weakness and numbness. Negative for dizziness, syncope and headaches.   Psychiatric/Behavioral: Negative for confusion.       Physical Exam     Initial Vitals [10/12/19 1927]   BP Pulse Resp Temp SpO2   111/67 95 16 98 °F (36.7 °C) 97 %      MAP       --          Physical Exam    Constitutional: She appears well-developed and well-nourished. She is cooperative.  Non-toxic appearance. No distress.   HENT:   Head: Normocephalic and atraumatic.   Eyes: Conjunctivae and EOM are normal. Pupils are equal, round, and reactive to light.   Neck: Normal range of motion. Neck supple.   Cardiovascular: Normal rate. Exam reveals no gallop and no friction rub.    No murmur heard.  Pulmonary/Chest: Effort normal. No respiratory distress. She has no decreased breath sounds. She has no wheezes. She has no rhonchi. She has no rales.   Abdominal: Soft. There is no tenderness.   Neurological: She is alert and oriented to person, place, and time. A cranial nerve deficit and sensory deficit is present. She exhibits normal muscle tone.   5/5 strength RUE. 0/5 LUE  5/5 strength RLE. 3/5 LLE     Skin: Skin is warm, dry and intact.         ED Course   Procedures  Labs Reviewed   POCT GLUCOSE - Abnormal; Notable for the following components:       Result Value    POCT Glucose 130 (*)     All other components within normal limits          Imaging Results           MRA Neck without contrast (Final result)  Result time 10/12/19 22:14:29    Final result by Tim Conrad MD (10/12/19 22:14:29)                 Impression:      The right common carotid and internal carotid artery are diminutive with minimal flow throughout.  There may be segmental sections of trickle flow.  The right internal carotid artery is quite diminutive at the entrance to the skull base and cavernous sinus.  The appearance suggest diffuse disease potential with thrombosis.  No definite findings for dissection.    The left carotid and bilateral vertebral arteries appear normal.    This report was flagged in Epic as abnormal.      Electronically signed by: Tim Conrad  Date:    10/12/2019  Time:    22:14             Narrative:    EXAMINATION:  MRA NECK WITHOUT CONTRAST    CLINICAL HISTORY:  stroke;    TECHNIQUE:  Routine MRI  brain without contrast, noncontrast MRA of the brain, and noncontrast MRA of the neck. Multiplanar multisequence MR imaging of the brain was performed without contrast. By separate acquisition, noncontrast MR angiography was performed of the intracranial vasculature with 3D time-of-flight technique through the [place], with MIP reformatting. Non-contrast 2D and/or 3D time-of-flight MR angiography of the neck was performed, with MIP reformatting.    COMPARISON:  None    FINDINGS:  The visualized thoracic aorta and pulmonary arteries appear normal.  The brachiocephalic artery appears normal.  The bilateral subclavian arteries are not well demonstrated perhaps due to in plane flow artifact.    The right common carotid artery is diminutive.  The right internal carotid artery shows severe disease and severe narrowing of probably 70-90% throughout.  There may be segmental the sections of trickle flow.  There is motion degradation.  The right internal carotid artery is very diminutive through the skull base and entering to the cavernous sinus.  No definite findings for dissection.    The left common carotid artery and the left external carotid artery and the left internal carotid artery appears normal through the neck and skull base despite motion degradation.  Vertebral arteries appear codominant without stenosis found despite motion degradation.  Basilar artery appears normal.                               MRA Brain without contrast (Final result)  Result time 10/12/19 22:09:31    Final result by Tim Conrad MD (10/12/19 22:09:31)                 Impression:      Marked narrowing of the visualized right internal carotid artery through the skull base with occlusion of the mid right M1 segment of the MCA.    Findings are discussed with Fior the patient's nurse at 22:07 on date of exam.      Electronically signed by: Tim Conrad  Date:    10/12/2019  Time:    22:09             Narrative:     EXAMINATION:  MRA BRAIN WITHOUT CONTRAST    CLINICAL HISTORY:  stroke;    TECHNIQUE:  Non-contrast 3-D time-of-flight intracranial MR angiography was performed through the [place] with MIP reformatting.  Without IV contrast    COMPARISON:  Brain MRI from 10/12/2019 and head CT from 10/12/2019.    FINDINGS:  Visualized vertebral arteries appear codominant without stenosis.  The basilar artery appears normal.  The left P1 segment and PCA appears normal.  No left posterior communicating artery.  The superior cerebellar arteries appear normal.  The right P1 segment PCA appears normal.  There is a right posterior communicating artery seen.    The left internal carotid artery appears normal through the skull base and cavernous sinus.  The left M1 segment and M2 branches appear normal.  The left A1 segment and HAIR appears normal.    Right internal carotid artery is very diminutive and tapers to occlusion at the entrance to the cavernous sinus.  There is moderate flow phenomenon seen in the right M1 segment probably from cross flow the anterior communicating artery.  The right M1 segment than truncates about 1.5 cm from its origin consistent with embolism.  The right HAIR appears normal.  The right A1 segment is moderate in size.                                MRI BRAIN WITHOUT CONTRAST (Final result)  Result time 10/12/19 21:56:52    Final result by Michael Colin MD (10/12/19 21:56:52)                 Impression:      Acute right MCA distribution infarction predominantly involving the right basal ganglia.  No evidence of hemorrhagic conversion.    Partially absent right ICA and MCA flow voids likely representing vascular occlusions corresponding to abnormal findings on MRA reported separately.    This report was flagged in Epic as abnormal.    Electronically signed by resident: David Cazares  Date:    10/12/2019  Time:    21:36    Electronically signed by: Michael Colin  MD  Date:    10/12/2019  Time:    21:56             Narrative:    EXAMINATION:  MRI BRAIN WITHOUT CONTRAST    CLINICAL HISTORY:  stroke;.    TECHNIQUE:  Multiplanar, multisequence MR imaging of the brain was performed without contrast.    COMPARISON:  CT head without contrast 10/12/2019 and MRA head October 12, 2019.    FINDINGS:  Intracranial compartment:    Moderate sized region of acute infarction within the right basal ganglia predominately involving the lentiform nucleus, caudate, and anterior limb of the internal capsule.  There is a mild degree of associated mass effect resulting in impression upon the right lateral ventricle.  No evidence of hydrocephalus.  No evidence of hemorrhagic conversion.  No acute infarction elsewhere.    No acute parenchymal hemorrhage or mass lesion.  No midline shift.    No extra-axial hemorrhage or fluid collections.    Partially absent right ICA and MCA flow voids likely representing vascular occlusions.  Remaining T2 intracranial flow voids are preserved.    Skull/extracranial contents (limited evaluation): Bone marrow signal intensity is normal.                               CT Head Without Contrast (Final result)  Result time 10/12/19 21:08:08    Final result by Michael Colin MD (10/12/19 21:08:08)                 Impression:      Hypoattenuation in a right MCA distribution with loss of gray-white differentiation and hyperdense MCA sign concerning for acute stroke with right MCA thrombus.    The critical information above was relayed directly by Rafael Ferrer MD by telephone to Amara Snow on 10/12/2019 at 20:39.    Electronically signed by resident: Rafael Ferrer  Date:    10/12/2019  Time:    20:35    Electronically signed by: Michael Colin MD  Date:    10/12/2019  Time:    21:08             Narrative:    EXAMINATION:  CT HEAD WITHOUT CONTRAST    CLINICAL HISTORY:  stroke;    TECHNIQUE:  Low dose axial CT images obtained throughout the head without the use of  intravenous contrast.  Axial, sagittal and coronal reconstructions were performed.    COMPARISON:  None.    FINDINGS:  Ventricles and sulci are normal in size for age without evidence of hydrocephalus.    Extensive hypoattenuation in a right MCA distribution.  Diminished gray-white differentiation involving the basal ganglia and hyperdense MCA sign on the right.  No parenchymal mass, hemorrhage, edema or major vascular distribution infarct.    No extra-axial blood or fluid collections.    Skull/extracranial contents (limited evaluation):    No fracture. Mastoid air cells and paranasal sinuses are essentially clear.                                 Medical Decision Making:   ED Management:  Vascular Neurology evaluated patient. Discussed patient with Vascular Neurology who state that CTA revealed high grade ICA stenosis but no large vessel occlusion. Patient will be admitted to Vascular Neurology for stroke workup.                        Clinical Impression:       ICD-10-CM ICD-9-CM   1. Stroke I63.9 434.91         Disposition:   Disposition: Admitted  Condition: Serious                        Harrison Mejias PA-C  10/13/19 1724       Harrison Mejias PA-C  10/13/19 1724

## 2019-10-13 NOTE — ASSESSMENT & PLAN NOTE
CTA at outside facility demonstrating R ICA high grade stenosis   DAPT + Statin   VAS US carotid bilateral ordered/pending  May consult vascular surgery

## 2019-10-13 NOTE — SUBJECTIVE & OBJECTIVE
Past Medical History:   Diagnosis Date    Coronary artery disease     Hypertension      Past Surgical History:   Procedure Laterality Date    CARDIAC SURGERY       History reviewed. No pertinent family history.  Social History     Tobacco Use    Smoking status: Never Smoker    Smokeless tobacco: Never Used   Substance Use Topics    Alcohol use: Yes    Drug use: Not on file     Review of patient's allergies indicates:  Allergies not on file    Medications: I have reviewed the current medication administration record.      (Not in a hospital admission)    Review of Systems   Constitutional: Negative for fever.   HENT: Negative for trouble swallowing.    Eyes: Negative for visual disturbance.   Respiratory: Negative for shortness of breath.    Cardiovascular: Negative for chest pain.   Gastrointestinal: Negative for nausea and vomiting.   Genitourinary: Negative for urgency.   Musculoskeletal: Positive for gait problem.   Skin: Negative for color change.   Neurological: Positive for facial asymmetry, speech difficulty and weakness.   Psychiatric/Behavioral: Negative for agitation and confusion.     Objective:     Vital Signs (Most Recent):  Temp: 98 °F (36.7 °C) (10/12/19 1927)  Pulse: 95 (10/12/19 1927)  Resp: 16 (10/12/19 1927)  BP: 111/67 (10/12/19 1927)  SpO2: 97 % (10/12/19 1927)    Vital Signs Range (Last 24H):  Temp:  [98 °F (36.7 °C)]   Pulse:  [95]   Resp:  [16]   BP: (111)/(67)   SpO2:  [97 %]     Physical Exam   Constitutional: She is oriented to person, place, and time. She appears well-developed.   HENT:   Head: Normocephalic.   Eyes: Pupils are equal, round, and reactive to light.   Cardiovascular: Normal rate.   Pulmonary/Chest: Effort normal.   Abdominal: Soft.   Musculoskeletal:   Left sided hemiparesis    Neurological: She is alert and oriented to person, place, and time.   Skin: Skin is warm. Capillary refill takes less than 2 seconds.   Psychiatric: She has a normal mood and affect.        Neurological Exam:   LOC: alert  Attention Span: Good   Language: No aphasia  Articulation: Dysarthria  Orientation: Person, Place, Time   Visual Fields: R gaze preference but able to cross midline   Pupils (CN II, III): PERRL  Facial Movement (CN VII): Lower facial weakness on the Left  Motor: Arm left  Plegia 0/5  Leg left  Paresis: 2/5  Arm right  Normal 5/5  Leg right Normal 5/5  Cebellar: No evidence of appendicular or axial ataxia  Sensation: Intact to light touch, temperature and vibration      Laboratory:  CMP: No results for input(s): GLUCOSE, CALCIUM, ALBUMIN, PROT, NA, K, CO2, CL, BUN, CREATININE, ALKPHOS, ALT, AST, BILITOT in the last 24 hours.  CBC: No results for input(s): WBC, RBC, HGB, HCT, PLT, MCV, MCH, MCHC in the last 168 hours.  Lipid Panel: No results for input(s): CHOL, LDLCALC, HDL, TRIG in the last 168 hours.  Coagulation: No results for input(s): PT, INR, APTT in the last 168 hours.  Hgb A1C: No results for input(s): HGBA1C in the last 168 hours.  TSH: No results for input(s): TSH in the last 168 hours.    Diagnostic Results:      Brain imaging:      Vessel Imaging:  CTA head and neck at outside facility   R ICA stenosis 99%  L ICA stenosis 70%    Cardiac Evaluation:   TTE pending

## 2019-10-14 PROBLEM — M25.512 ACUTE PAIN OF LEFT SHOULDER: Status: ACTIVE | Noted: 2019-10-14

## 2019-10-14 PROBLEM — I65.23 INTERNAL CAROTID ARTERY STENOSIS, BILATERAL: Status: ACTIVE | Noted: 2019-10-12

## 2019-10-14 LAB
ALBUMIN SERPL BCP-MCNC: 3.4 G/DL (ref 3.5–5.2)
ALP SERPL-CCNC: 73 U/L (ref 55–135)
ALT SERPL W/O P-5'-P-CCNC: 19 U/L (ref 10–44)
ANION GAP SERPL CALC-SCNC: 12 MMOL/L (ref 8–16)
AST SERPL-CCNC: 22 U/L (ref 10–40)
BASOPHILS # BLD AUTO: 0.09 K/UL (ref 0–0.2)
BASOPHILS NFR BLD: 0.9 % (ref 0–1.9)
BILIRUB SERPL-MCNC: 0.4 MG/DL (ref 0.1–1)
BUN SERPL-MCNC: 16 MG/DL (ref 6–20)
CALCIUM SERPL-MCNC: 9.2 MG/DL (ref 8.7–10.5)
CHLORIDE SERPL-SCNC: 103 MMOL/L (ref 95–110)
CO2 SERPL-SCNC: 26 MMOL/L (ref 23–29)
CREAT SERPL-MCNC: 0.8 MG/DL (ref 0.5–1.4)
DIFFERENTIAL METHOD: ABNORMAL
EOSINOPHIL # BLD AUTO: 0.3 K/UL (ref 0–0.5)
EOSINOPHIL NFR BLD: 2.8 % (ref 0–8)
ERYTHROCYTE [DISTWIDTH] IN BLOOD BY AUTOMATED COUNT: 15.7 % (ref 11.5–14.5)
EST. GFR  (AFRICAN AMERICAN): >60 ML/MIN/1.73 M^2
EST. GFR  (NON AFRICAN AMERICAN): >60 ML/MIN/1.73 M^2
GLUCOSE SERPL-MCNC: 147 MG/DL (ref 70–110)
HCT VFR BLD AUTO: 38.5 % (ref 37–48.5)
HGB BLD-MCNC: 12.3 G/DL (ref 12–16)
IMM GRANULOCYTES # BLD AUTO: 0.05 K/UL (ref 0–0.04)
IMM GRANULOCYTES NFR BLD AUTO: 0.5 % (ref 0–0.5)
LYMPHOCYTES # BLD AUTO: 3.2 K/UL (ref 1–4.8)
LYMPHOCYTES NFR BLD: 32.4 % (ref 18–48)
MCH RBC QN AUTO: 28.9 PG (ref 27–31)
MCHC RBC AUTO-ENTMCNC: 31.9 G/DL (ref 32–36)
MCV RBC AUTO: 91 FL (ref 82–98)
MONOCYTES # BLD AUTO: 0.8 K/UL (ref 0.3–1)
MONOCYTES NFR BLD: 8 % (ref 4–15)
NEUTROPHILS # BLD AUTO: 5.6 K/UL (ref 1.8–7.7)
NEUTROPHILS NFR BLD: 55.4 % (ref 38–73)
NRBC BLD-RTO: 0 /100 WBC
PLATELET # BLD AUTO: 238 K/UL (ref 150–350)
PMV BLD AUTO: 10.7 FL (ref 9.2–12.9)
POTASSIUM SERPL-SCNC: 3.8 MMOL/L (ref 3.5–5.1)
PROT SERPL-MCNC: 6.7 G/DL (ref 6–8.4)
RBC # BLD AUTO: 4.25 M/UL (ref 4–5.4)
SODIUM SERPL-SCNC: 141 MMOL/L (ref 136–145)
WBC # BLD AUTO: 10.01 K/UL (ref 3.9–12.7)

## 2019-10-14 PROCEDURE — 93880 EXTRACRANIAL BILAT STUDY: CPT | Performed by: SURGERY

## 2019-10-14 PROCEDURE — 99252 IP/OBS CONSLTJ NEW/EST SF 35: CPT | Mod: ,,, | Performed by: NURSE PRACTITIONER

## 2019-10-14 PROCEDURE — 63600175 PHARM REV CODE 636 W HCPCS: Performed by: NURSE PRACTITIONER

## 2019-10-14 PROCEDURE — 97535 SELF CARE MNGMENT TRAINING: CPT

## 2019-10-14 PROCEDURE — 20600001 HC STEP DOWN PRIVATE ROOM

## 2019-10-14 PROCEDURE — 63600175 PHARM REV CODE 636 W HCPCS: Performed by: PHYSICIAN ASSISTANT

## 2019-10-14 PROCEDURE — 99233 SBSQ HOSP IP/OBS HIGH 50: CPT | Mod: ,,, | Performed by: PSYCHIATRY & NEUROLOGY

## 2019-10-14 PROCEDURE — 80053 COMPREHEN METABOLIC PANEL: CPT

## 2019-10-14 PROCEDURE — 97112 NEUROMUSCULAR REEDUCATION: CPT

## 2019-10-14 PROCEDURE — 25000003 PHARM REV CODE 250: Performed by: NURSE PRACTITIONER

## 2019-10-14 PROCEDURE — 36415 COLL VENOUS BLD VENIPUNCTURE: CPT

## 2019-10-14 PROCEDURE — 99233 PR SUBSEQUENT HOSPITAL CARE,LEVL III: ICD-10-PCS | Mod: ,,, | Performed by: PSYCHIATRY & NEUROLOGY

## 2019-10-14 PROCEDURE — 99252 PR INITIAL INPATIENT CONSULT,LEVL II: ICD-10-PCS | Mod: ,,, | Performed by: NURSE PRACTITIONER

## 2019-10-14 PROCEDURE — 92523 SPEECH SOUND LANG COMPREHEN: CPT

## 2019-10-14 PROCEDURE — 99223 1ST HOSP IP/OBS HIGH 75: CPT | Mod: ,,, | Performed by: SURGERY

## 2019-10-14 PROCEDURE — 99223 PR INITIAL HOSPITAL CARE,LEVL III: ICD-10-PCS | Mod: ,,, | Performed by: SURGERY

## 2019-10-14 PROCEDURE — 85025 COMPLETE CBC W/AUTO DIFF WBC: CPT

## 2019-10-14 RX ORDER — SODIUM CHLORIDE 9 MG/ML
INJECTION, SOLUTION INTRAVENOUS CONTINUOUS
Status: DISCONTINUED | OUTPATIENT
Start: 2019-10-14 | End: 2019-10-15

## 2019-10-14 RX ORDER — SODIUM CHLORIDE 9 MG/ML
INJECTION, SOLUTION INTRAVENOUS CONTINUOUS
Status: DISCONTINUED | OUTPATIENT
Start: 2019-10-14 | End: 2019-10-14

## 2019-10-14 RX ADMIN — SODIUM CHLORIDE 500 ML: 0.9 INJECTION, SOLUTION INTRAVENOUS at 05:10

## 2019-10-14 RX ADMIN — HEPARIN SODIUM 5000 UNITS: 5000 INJECTION, SOLUTION INTRAVENOUS; SUBCUTANEOUS at 09:10

## 2019-10-14 RX ADMIN — HEPARIN SODIUM 5000 UNITS: 5000 INJECTION, SOLUTION INTRAVENOUS; SUBCUTANEOUS at 05:10

## 2019-10-14 RX ADMIN — ATORVASTATIN CALCIUM 40 MG: 20 TABLET, FILM COATED ORAL at 09:10

## 2019-10-14 RX ADMIN — PANTOPRAZOLE SODIUM 40 MG: 40 TABLET, DELAYED RELEASE ORAL at 09:10

## 2019-10-14 RX ADMIN — ACETAMINOPHEN 650 MG: 325 TABLET ORAL at 09:10

## 2019-10-14 RX ADMIN — ASPIRIN 81 MG: 81 TABLET, COATED ORAL at 09:10

## 2019-10-14 RX ADMIN — SODIUM CHLORIDE: 0.9 INJECTION, SOLUTION INTRAVENOUS at 01:10

## 2019-10-14 RX ADMIN — CLOPIDOGREL BISULFATE 75 MG: 75 TABLET ORAL at 09:10

## 2019-10-14 RX ADMIN — SODIUM CHLORIDE: 0.9 INJECTION, SOLUTION INTRAVENOUS at 02:10

## 2019-10-14 RX ADMIN — HEPARIN SODIUM 5000 UNITS: 5000 INJECTION, SOLUTION INTRAVENOUS; SUBCUTANEOUS at 01:10

## 2019-10-14 NOTE — HPI
Karen Ellison is a 59-year-old female with PMHx of CAD and HTN. Patient presented to [facility] w/ L sided weakness and slurred speech. CTA conformed no LVO. Telestroke completed and transferred to [facility] 10/12/19. MRI/MRA with R M1 occlusion, no interventions.     Functional History: Patient liveslives with her spouse in a house with 14 [address]..  Prior to admission, (I). DME: none.

## 2019-10-14 NOTE — PROGRESS NOTES
[facility]  Vascular Neurology  Comprehensive Stroke Center  Progress Note    Assessment/Plan:     * Embolic stroke involving right middle cerebral artery  58 yo woman with PMHx HTN, CAD, CHF, asthma who presented to [facility] with acute onset of L-sided weakness, slurred speech, and L facial droop. Telestroke completed an no tPA recommended as outside treatment window. Outside CTA then did not reveal an LVO so pt not a candidate for acute IR intervention, but had concerns for high-grade R ICA stenosis. Admitted for acute stroke workup.    MRI Brain demonstrating acute R MCA distribution infarct. Unclear source of lesion (outside CD) - MRA here showing what appears to be R ICA extracranial high-grade stenosis but also an abrupt lack of flow void in distal ICA/cavernous segment, suggesting possible thrombus, good flow from COW collaterals with additional R MCA M1 abrupt occlusion. Overall, favor R ICA origin disease w/ artery to artery athero-embolism, though mild L atrial enlargement seen on echo as well so cannot definitively rule out ESUS process as well.     VAS US Carotids concerning for R ICA occlusion with reconstituted flow as well as L ICA stenosis; consulted with Vascular Surgery team (see below.) Started IVFs to bolster BP for good cerebral perfusion.      Antithrombotics for secondary stroke prevention: Antiplatelets: Aspirin: 81 mg daily  Clopidogrel: 75 mg daily  Statins for secondary stroke prevention and hyperlipidemia, if present:   Statins: Atorvastatin- 40 mg daily  Aggressive risk factor modification: HTN, HLD, Diet, Exercise  Rehab efforts: The patient has been evaluated by a stroke team provider and the therapy needs have been fully considered based off the presenting complaints and exam findings. The following therapy evaluations are needed: PT evaluate and treat, OT evaluate and treat, SLP evaluate and treat, PM&R evaluate for appropriate placement - Dispo rehab  Diagnostics  ordered/pending: None   VTE prophylaxis: Heparin 5000 units SQ every 8 hours and mechanical SCDs  BP parameters: carotid stenosis: No restriction if asymptomatic     Internal carotid artery stenosis, bilateral  Stroke risk factor  CTA at outside facility reported R ICA high grade stenosis. As seen on MRA here as well.  VAS US Carotids 10/14 with evidence of R ICA occlusion and L ICA stenosis.   Discussed with Vascular Surgery team who requested formal consult, re: inpatient vs outpatient intervention vs medical management.  Started IVFs to bolster BP for good cerebral perfusion in the setting of carotid stenosis.  DAPT + statin     Acute pain of left shoulder  Pt slid off bedside commode 10/13 evening, c/o L shoulder pain 10/14 AM  XR 10/14 with no evidence of fracture or other acute process.   Tylenol PRN, nurse to apply ice packs    Essential hypertension  Stroke risk factor  SBP goal 160-180; Range  over last 24 hrs  Started IVFs to bolster BP for good cerebral perfusion.   Will continue to monitor    Cytotoxic cerebral edema  Area of cytotoxic cerebral edema identified when reviewing brain imaging in the territory of the R middle cerebral artery. There is not mass effect associated with it. We will continue to monitor the patients clinical exam for any worsening of symptoms which may indicate expansion of the stroke or the area of the edema resulting in the clinical change. The pattern is suggestive of a large artery atheroembolic etiology.    Mixed hyperlipidemia  Stroke risk factor  LDL 71   Atorvastatin 40 mg        10/12 - R MCA stroke. CTA at outside hospital with R ICA stenosis. Transferred for higher level of care.   10/13 - MRI/MRA with R M1 occlusion, no interventions out of window. Exam improved, NIH from 12 to 6. US carotids ordered. UA WBCs 7, pt denies symptoms of UTI, a febrile. SLP cleared for mechanical soft diet. Therapy recommending rehab.   10/14: Pt slid off bedside commode yesterday  evening, c/o L shoulder pain this AM; XR with no acute process. VAS US Carotids with R ICA occlusion and L ICA stenosis; discussed consult with Vascular Surgery team. Started IVFs to bolster BP for good cerebral perfusion. PRN Tylenol for HA. Dispo rehab.    STROKE DOCUMENTATION   Acute Stroke Times   Last Known Normal Date: 10/12/19  Last Known Normal Time: 0730  Symptom Onset Date: 10/12/19  Symptom Onset Time: 0730  Stroke Team Called Date: 10/12/19  Stroke Team Called Time: 1930  Stroke Team Arrival Date: 10/12/19  Stroke Team Arrival Time: 1935  CT Interpretation Time: (NA)  Decision to Treat Time for Alteplase: (NA)  Decision to Treat Time for IR: (NA)    NIH Scale:  1a. Level of Consciousness: 0-->Alert, keenly responsive  1b. LOC Questions: 0-->Answers both questions correctly  1c. LOC Commands: 0-->Performs both tasks correctly  2. Best Gaze: 1-->Partial gaze palsy, gaze is abnormal in one or both eyes, but forced deviation or total gaze paresis is not present  3. Visual: 1-->Partial hemianopia  4. Facial Palsy: 2-->Partial paralysis (total or near-total paralysis of lower face)  5a. Motor Arm, Left: 1-->Drift, limb holds 90 (or 45) degrees, but drifts down before full 10 seconds, does not hit bed or other support  5b. Motor Arm, Right: 0-->No drift, limb holds 90 (or 45) degrees for full 10 secs  6a. Motor Leg, Left: 1-->Drift, leg falls by the end of the 5-sec period but does not hit bed  6b. Motor Leg, Right: 0-->No drift, leg holds 30 degree position for full 5 secs  7. Limb Ataxia: 0-->Absent  8. Sensory: 1-->Mild-to-moderate sensory loss, patient feels pinprick is less sharp or is dull on the affected side, or there is a loss of superficial pain with pinprick, but patient is aware of being touched  9. Best Language: 0-->No aphasia, normal  10. Dysarthria: 1-->Mild-to-moderate dysarthria, patient slurs at least some words and, at worst, can be understood with some difficulty  11. Extinction and  Inattention (formerly Neglect): 0-->No abnormality  Total (NIH Stroke Scale): 8       Modified Deuardo Score: 1  Leblanc Coma Scale:    ABCD2 Score:    [id number] Score:   HAS -BLED Score:   ICH Score:   Hunt & Prakash Classification:      Hemorrhagic change of an Ischemic Stroke: Does this patient have an ischemic stroke with hemorrhagic changes? No     Neurologic Chief Complaint: Left-sided weakness, slurred speech, L facial droop    Subjective:     Interval History: Patient is seen for follow-up neurological assessment and treatment recommendations:   Pt slid off bedside commode yesterday evening, c/o L shoulder pain this AM; XR with no acute process. VAS US Carotids with R ICA occlusion and L ICA stenosis; discussed consult with Vascular Surgery team. Started IVFs to bolster BP for good cerebral perfusion. PRN Tylenol for HA. Dispo rehab.    HPI, Past Medical, Family, and Social History remains the same as documented in the initial encounter.     Review of Systems   Constitutional: Negative for chills and fever.   Eyes: Positive for visual disturbance. Negative for discharge.   Gastrointestinal: Negative for nausea and vomiting.   Neurological: Positive for facial asymmetry, speech difficulty, weakness, numbness and headaches.   Psychiatric/Behavioral: Negative for agitation and confusion.     Scheduled Meds:   aspirin  81 mg Oral Daily    atorvastatin  40 mg Oral Daily    clopidogrel  75 mg Oral Daily    heparin (porcine)  5,000 Units Subcutaneous Q8H    pantoprazole  40 mg Oral Daily     Continuous Infusions:   sodium chloride 0.9% 100 mL/hr at 10/14/19 1402     PRN Meds:acetaminophen, labetalol, sodium chloride 0.9%    Objective:     Vital Signs (Most Recent):  Temp: 96.3 °F (35.7 °C) (10/14/19 1300)  Pulse: 102 (10/14/19 1516)  Resp: 20 (10/14/19 1300)  BP: 136/82 (10/14/19 1300)  SpO2: 95 % (10/14/19 1300)  BP Location: Right arm    Vital Signs Range (Last 24H):  Temp:  [96.3 °F (35.7 °C)-98.2 °F (36.8  °C)]   Pulse:  []   Resp:  [18-20]   BP: ()/(64-82)   SpO2:  [94 %-97 %]   BP Location: Right arm    Physical Exam   Constitutional: She is oriented to person, place, and time. She appears well-developed and well-nourished. No distress.   HENT:   Head: Normocephalic and atraumatic.   Eyes: Conjunctivae are normal. No scleral icterus.   Cardiovascular: Normal rate.   Pulmonary/Chest: Effort normal. No respiratory distress.   Musculoskeletal: She exhibits tenderness (anterior L shoulder. No edema noted). She exhibits no edema or deformity.   Neurological: She is alert and oriented to person, place, and time. A sensory deficit is present.   Skin: Skin is warm and dry.   Psychiatric: She has a normal mood and affect. Her behavior is normal. Cognition and memory are not impaired. She is attentive.   Vitals reviewed.      Neurological Exam:   LOC: alert  Attention Span: Good   Language: No aphasia  Articulation: Dysarthria  Orientation: Person, Place, Time   Visual Fields: Superior quadrantanopsia: left  EOM (CN III, IV, VI): Gaze preference  right  Facial Movement (CN VII): Lower facial weakness on the Left  Motor: Arm left  Paresis: 3/5  Leg left  Paresis: 4/5  Arm right  Normal 5/5  Leg right Normal 5/5  Sensation: Carlos-hypoesthesia left  Tone: Normal tone throughout    Laboratory:  CMP:   Recent Labs   Lab 10/14/19  0449   CALCIUM 9.2   ALBUMIN 3.4*   PROT 6.7      K 3.8   CO2 26      BUN 16   CREATININE 0.8   ALKPHOS 73   ALT 19   AST 22   BILITOT 0.4     BMP:   Recent Labs   Lab 10/14/19  0449      K 3.8      CO2 26   BUN 16   CREATININE 0.8   CALCIUM 9.2       Diagnostic Results     Brain imaging:     MRI Brain WO Contrast 10/13/19    Acute right MCA distribution infarction predominantly involving the right basal ganglia.  No evidence of hemorrhagic conversion.  Partially absent right ICA and MCA flow voids likely representing vascular occlusions corresponding to abnormal  findings on MRA reported separately.      CT Head 10/12/19  Hypoattenuation in a right MCA distribution with loss of gray-white differentiation and hyperdense MCA sign concerning for acute stroke with right MCA thrombus.      Vessel Imaging:    VAS US Carotids MACI 10/14/19  Impression:   RIGHT SIDE:   The proximal right ICA appears to be occluded. Retrograde branches are noted reconstituting flow into the mid/distal ICA.   Calcification of the right internal carotid artery.   Heterogeneous plaque in the right common carotid artery, which appears to have blunted flow.   Antegrade flow in the right vertebral artery.  LEFT SIDE:  60-79% Left ICA stenosis.  Calcification of the left internal carotid artery.  Heterogeneous plaque in the left common carotid artery.   Antegrade flow in the left vertebral artery.    MRA Brain/Neck 10/13/19  Marked narrowing of the visualized right internal carotid artery through the skull base with occlusion of the mid right M1 segment of the MCA.  The right common carotid and internal carotid artery are diminutive with minimal flow throughout. There may be segmental sections of trickle flow. The right internal carotid artery is quite diminutive at the entrance to the skull base and cavernous sinus. The appearance suggest diffuse disease potential with thrombosis.  No definite findings for dissection.  The left carotid and bilateral vertebral arteries appear normal.    CTA Head/Neck at outside facility   R ICA stenosis 99%  L ICA stenosis 70%       Cardiac Evaluation:     TTE 10/13/19  · Normal left ventricular systolic function. The estimated ejection fraction is 65%.  · Normal right ventricular systolic function.  · Indeterminate left ventricular diastolic function.  · Mild left atrial enlargement.  · No wall motion abnormalities.      Arcelia Bazzi PA-C  Comprehensive Stroke Center  Department of Vascular Neurology   [facility]

## 2019-10-14 NOTE — PT/OT/SLP EVAL
"Speech Language Pathology Evaluation  Cognitive Communication    Patient Name:  Karen Ellison   MRN:  [id number]  Admitting Diagnosis: Embolic stroke involving right middle cerebral artery    Recommendations:     Recommendations:                General Recommendations:  Dysphagia therapy, Speech/language therapy and Cognitive-linguistic therapy  Diet recommendations:  Mechanical soft, Thin   Aspiration Precautions: 1 bite/sip at a time, Alternating bites/sips, Assistance with meals, Avoid talking while eating, Check for pocketing/oral residue - encourage finger sweep during and after meal to manage pocketing, Eliminate distractions, Frequent oral care, HOB to 90 degrees, Meds whole 1 at a time, Monitor for s/s of aspiration, Small bites/sips and Strict aspiration precautions   General Precautions: Standard, aspiration, fall  Communication strategies:  none    History:     Past Medical History:   Diagnosis Date    Coronary artery disease     Hypertension        Past Surgical History:   Procedure Laterality Date    CARDIAC SURGERY       Occupation/hobbies/homemaking: 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 She drives, cooks, and manages medications and finances.     Subjective     "It's not really happening as much." pt stated regarding pocketing of food since diet change to mechanical soft and implementation of strategies to manage pocketing.    Pain/Comfort:  · Pain Rating 1: 0/10    Objective:   Cognitive Status:    Ox 4. Pt immediately recalled a series of 7 digits. Following a 3 minute delay, she recalled 3/3 words given min cues (2/3 ind'ly). Pt provided solutions to problems, generated multiple causes for a problem, and completed a 4-word sequencing task with 100% accuracy.      Receptive Language:   Comprehension:   WFL/WNL. Pt answered complex yes/no q's and followed a 3-step command with 100% acc.     Pragmatics:    WFL    Expressive Language:  Verbal:    Conversational speech WFL/WNL. No " evidence of expressive language or word finding deficits when expressing basic and complex information. During a word fluency task, pt listed 11 items in a category given cue x 1 within one minute (15-20 is wnl).       Motor Speech:  Dysarthria mild   Intelligibility good    Voice:   WFL    Visual-Spatial:  tba    Reading:   WFL, Sentence 100%  and Paragraph 100%     Written Expression:   tba    Treatment: Education provided to pt/spouse regarding evaluation of speech/language/cognition, results of evaluation, mild dysarthria, oral motor exercises (copy of exercises provided with pt, reviewed, demonstrated), left attention, strategies to improve left attention, benefits of inpatient rehab, and SLP treatment plan and POC. Pt/spouse expressed good understanding. Pt appears motivated as well.     Assessment:   Karen Ellison is a 59 y.o. female with an SLP diagnosis of Dysphagia and Dysarthria.  Possible left inattention (to be assessed).     Goals:   Multidisciplinary Problems     SLP Goals        Problem: SLP Goal    Goal Priority Disciplines Outcome   SLP Goal     SLP Ongoing, Progressing   Description:  Speech Language Pathology Goals  Goals expected to be met by 10/21:  1. Pt will tolerate mechanical soft diet and thin liquids while utilizing strategies to manage pocketing and reduce risks of aspiration.  2. Pt will perform OME's x 5-10 to increase oral motor strength, ROM, and function.   3. Pt will participate in speech/language/cognitive evaluation to determine appropriate goals. Goal met 10/14:  ADDITIONAL GOALS:  3. Pt will complete delayed memory tasks with 90% accuracy given min cues.  4. Pt will utilize motor speech strategies during conversational speech to improve quality of speech/articulation.   5. Pt will participate in ongoing assessment of writing, math/time/money management, and visual spatial/left attention to determine need for further intervention.                        Plan:   · Patient to be  seen:  4 x/week   · Plan of Care expires:  11/12/19  · Plan of Care reviewed with:  patient, spouse   · SLP Follow-Up:  Yes       Discharge recommendations:  Discharge Facility/Level of Care Needs: rehabilitation facility     Time Tracking:   SLP Treatment Date:   10/14/19  Speech Start Time:  1459  Speech Stop Time:  1516     Speech Total Time (min):  17 min    Billable Minutes: Eval 9  and [facility]/Home Management Training 8    ODALIS Harris, CCC-SLP  10/14/2019       ODALIS Harris, CCC-SLP  Speech Language Pathologist  [phone]  10/14/2019

## 2019-10-14 NOTE — PLAN OF CARE
Problem: SLP Goal  Goal: SLP Goal  Description  Speech Language Pathology Goals  Goals expected to be met by 10/21:  1. Pt will tolerate mechanical soft diet and thin liquids while utilizing strategies to manage pocketing and reduce risks of aspiration.  2. Pt will perform OME's x 5-10 to increase oral motor strength, ROM, and function.   3. Pt will participate in speech/language/cognitive evaluation to determine appropriate goals. Goal met 10/14:  ADDITIONAL GOALS:  3. Pt will complete delayed memory tasks with 90% accuracy given min cues.  4. Pt will utilize motor speech strategies during conversational speech to improve quality of speech/articulation.   5. Pt will participate in ongoing assessment of writing, math/time/money management, and visual spatial/left attention to determine need for further intervention.       Outcome: Ongoing, Progressing    Speech/language/cognitive evaluation completed. Goals updated.  OME's provided.   ODALIS Harris, CCC-SLP  Speech Language Pathologist  [phone]  10/14/2019

## 2019-10-14 NOTE — SUBJECTIVE & OBJECTIVE
Neurologic Chief Complaint: Left-sided weakness, slurred speech, L facial droop    Subjective:     Interval History: Patient is seen for follow-up neurological assessment and treatment recommendations:   Pt slid off bedside commode yesterday evening, c/o L shoulder pain this AM; XR with no acute process. VAS US Carotids with R ICA occlusion and L ICA stenosis; discussed consult with Vascular Surgery team. Started IVFs to bolster BP for good cerebral perfusion. PRN Tylenol for HA. Dispo rehab.    HPI, Past Medical, Family, and Social History remains the same as documented in the initial encounter.     Review of Systems   Constitutional: Negative for chills and fever.   Eyes: Positive for visual disturbance. Negative for discharge.   Gastrointestinal: Negative for nausea and vomiting.   Neurological: Positive for facial asymmetry, speech difficulty, weakness, numbness and headaches.   Psychiatric/Behavioral: Negative for agitation and confusion.     Scheduled Meds:   aspirin  81 mg Oral Daily    atorvastatin  40 mg Oral Daily    clopidogrel  75 mg Oral Daily    heparin (porcine)  5,000 Units Subcutaneous Q8H    pantoprazole  40 mg Oral Daily     Continuous Infusions:   sodium chloride 0.9% 100 mL/hr at 10/14/19 1402     PRN Meds:acetaminophen, labetalol, sodium chloride 0.9%    Objective:     Vital Signs (Most Recent):  Temp: 96.3 °F (35.7 °C) (10/14/19 1300)  Pulse: 102 (10/14/19 1516)  Resp: 20 (10/14/19 1300)  BP: 136/82 (10/14/19 1300)  SpO2: 95 % (10/14/19 1300)  BP Location: Right arm    Vital Signs Range (Last 24H):  Temp:  [96.3 °F (35.7 °C)-98.2 °F (36.8 °C)]   Pulse:  []   Resp:  [18-20]   BP: ()/(64-82)   SpO2:  [94 %-97 %]   BP Location: Right arm    Physical Exam   Constitutional: She is oriented to person, place, and time. She appears well-developed and well-nourished. No distress.   HENT:   Head: Normocephalic and atraumatic.   Eyes: Conjunctivae are normal. No scleral icterus.    Cardiovascular: Normal rate.   Pulmonary/Chest: Effort normal. No respiratory distress.   Musculoskeletal: She exhibits tenderness (anterior L shoulder. No edema noted). She exhibits no edema or deformity.   Neurological: She is alert and oriented to person, place, and time. A sensory deficit is present.   Skin: Skin is warm and dry.   Psychiatric: She has a normal mood and affect. Her behavior is normal. Cognition and memory are not impaired. She is attentive.   Vitals reviewed.      Neurological Exam:   LOC: alert  Attention Span: Good   Language: No aphasia  Articulation: Dysarthria  Orientation: Person, Place, Time   Visual Fields: Superior quadrantanopsia: left  EOM (CN III, IV, VI): Gaze preference  right  Facial Movement (CN VII): Lower facial weakness on the Left  Motor: Arm left  Paresis: 3/5  Leg left  Paresis: 4/5  Arm right  Normal 5/5  Leg right Normal 5/5  Sensation: Carlos-hypoesthesia left  Tone: Normal tone throughout    Laboratory:  CMP:   Recent Labs   Lab 10/14/19  0449   CALCIUM 9.2   ALBUMIN 3.4*   PROT 6.7      K 3.8   CO2 26      BUN 16   CREATININE 0.8   ALKPHOS 73   ALT 19   AST 22   BILITOT 0.4     BMP:   Recent Labs   Lab 10/14/19  0449      K 3.8      CO2 26   BUN 16   CREATININE 0.8   CALCIUM 9.2       Diagnostic Results     Brain imaging:     MRI Brain WO Contrast 10/13/19    Acute right MCA distribution infarction predominantly involving the right basal ganglia.  No evidence of hemorrhagic conversion.  Partially absent right ICA and MCA flow voids likely representing vascular occlusions corresponding to abnormal findings on MRA reported separately.      CT Head 10/12/19  Hypoattenuation in a right MCA distribution with loss of gray-white differentiation and hyperdense MCA sign concerning for acute stroke with right MCA thrombus.      Vessel Imaging:    VAS US Carotids MACI 10/14/19  Impression:   RIGHT SIDE:   The proximal right ICA appears to be occluded.  Retrograde branches are noted reconstituting flow into the mid/distal ICA.   Calcification of the right internal carotid artery.   Heterogeneous plaque in the right common carotid artery, which appears to have blunted flow.   Antegrade flow in the right vertebral artery.  LEFT SIDE:  60-79% Left ICA stenosis.  Calcification of the left internal carotid artery.  Heterogeneous plaque in the left common carotid artery.   Antegrade flow in the left vertebral artery.    MRA Brain/Neck 10/13/19  Marked narrowing of the visualized right internal carotid artery through the skull base with occlusion of the mid right M1 segment of the MCA.  The right common carotid and internal carotid artery are diminutive with minimal flow throughout. There may be segmental sections of trickle flow. The right internal carotid artery is quite diminutive at the entrance to the skull base and cavernous sinus. The appearance suggest diffuse disease potential with thrombosis.  No definite findings for dissection.  The left carotid and bilateral vertebral arteries appear normal.    CTA Head/Neck at outside facility   R ICA stenosis 99%  L ICA stenosis 70%       Cardiac Evaluation:     TTE 10/13/19  · Normal left ventricular systolic function. The estimated ejection fraction is 65%.  · Normal right ventricular systolic function.  · Indeterminate left ventricular diastolic function.  · Mild left atrial enlargement.  · No wall motion abnormalities.

## 2019-10-14 NOTE — PT/OT/SLP PROGRESS
"Occupational Therapy   Treatment    Name: Karen Ellison  MRN: [id number]  Admitting Diagnosis:  Embolic stroke involving right middle cerebral artery       Recommendations:     Discharge Recommendations: rehabilitation facility  Discharge Equipment Recommendations:  shower chair  Barriers to discharge:  Inaccessible home environment, Decreased caregiver support    Assessment:     Karen Ellison is a 59 y.o. female with a medical diagnosis of Embolic stroke involving right middle cerebral artery.  She presents with performance deficits affecting function are weakness, impaired self care skills, impaired balance, decreased coordination, decreased safety awareness, decreased ROM, impaired endurance, impaired functional mobilty, gait instability, impaired cognition, decreased lower extremity function, decreased upper extremity function, abnormal tone, impaired coordination, impaired fine motor.     Rehab Prognosis:  Good; patient would benefit from acute skilled OT services to address these deficits and reach maximum level of function.       Plan:     Patient to be seen 5 x/week to address the above listed problems via self-care/home management, neuromuscular re-education, cognitive retraining, therapeutic activities, therapeutic exercises, sensory integration  · Plan of Care Expires: 11/10/19  · Plan of Care Reviewed with: patient, spouse    Subjective   Patient:  "It takes a lot of concentration; it's so tiring."  "That's encouraging to see improvement."  "I got on the potty yesterday with help.  When I went to wipe myself, my body just fell to the left."  Pain/Comfort:  · Pain Rating 1: 3/10  · Location 1: (left shoulder)  · Pain Addressed 1: Reposition(MD notified)  · Pain Rating Post-Intervention 1: 3/10    Objective:     Communicated with: Nurse prior to session.  Patient found supine with bed alarm, peripheral IV, telemetry upon OT entry to room.    General Precautions: Standard, aspiration, fall   Orthopedic " Precautions:N/A   Braces: N/A     Occupational Performance:     Bed Mobility:    · Patient completed Rolling/Turning to Left with  stand by assistance  · Patient completed Rolling/Turning to Right with contact guard assistance  · Patient completed Scooting/Bridging with contact guard assistance  · Patient completed Supine to Sit with contact guard assistance  · Patient completed Sit to Supine with contact guard assistance     Functional Mobility/Transfers:  · Patient completed Sit <> Stand Transfer with minimum assistance  with  no assistive device   · Patient completed Bed <> Chair Transfer using Stand Pivot technique with moderate assistance with no assistive device    Activities of Daily Living:  · Grooming: moderate assistance while standing  · Upper Body Dressing: moderate assistance while seated EOB  · Lower Body Dressing: moderate assistance while seated EOB     [facility] 6 Click ADL: 12    Treatment & Education:  Patient/ Family education provided for stroke warning signs, prevention guidelines and personal risk factors.  Patient/ Family verbalizing understanding via teach back method.  Patient education provided on role of OT and need for rehab upon discharge.  Patient education provided on hemiplegic dressing technique, left UE weight bearing / positioning, postural control, and transfers.  Continued education, patient/ family training recommended.  Patient alert and oriented x 3; able to follow 4/4 one step commands.  Patient attentive and interactive throughout the session.  Patient able to identify 5/5 body parts.  Able to name 5/5 objects.  Able to sequence 7/7 days of the week and 12/12 months of the year.  Addressed left UE weight bearing while seated EOB.  Addressed oral motor ex while seated.  Able to perform clock design with good visual spatial skills.  Able to perform line bisection task with accuracy.  Min assist with bridging x 10 rep.  Patient with increased weight shift to the left; able to  correct with min verbal cues.  Patient's functional status and disposition recommendation discussed with stroke team in daily rounds.  White board updated in patient's room.  OT asked if there were any other questions; patient/ family had no further questions.    Patient left supine with all lines intact and call button in reachEducation:      GOALS:   Multidisciplinary Problems     Occupational Therapy Goals        Problem: Occupational Therapy Goal    Goal Priority Disciplines Outcome Interventions   Occupational Therapy Goal     OT, PT/OT Ongoing, Progressing    Description:  Goals set 10/13 to be addressed for 14 days with expiration date, 10/27:  Patient will increase functional independence with ADLs by performing:    Patient will demonstrate rolling to the right with SBA assist.  Not met   Patient will demonstrate rolling to the left with modified independence.   Not met  Patient will demonstrate supine -sit with SBA.   Not met  Patient will demonstrate stand pivot transfers with CGA.   Not met  Patient will demonstrate grooming while standing with CGA.   Not met  Patient will demonstrate upper body dressing with min assist while seated EOB.   Not met  Patient will demonstrate lower body dressing with min assist while seated EOB.   Not met  Patient will demonstrate toileting with min assist.   Not met  Patient will demonstrate bathing while seated EOB with min assist.   Not met  Patient's family / caregiver will demonstrate independence and safety with assisting patient with self-care skills and functional mobility.     Not met  Patient's family / caregiver will demonstrate independence with providing ROM and changes in bed positioning.   Not met  Patient and/or patient's family will verbalize understanding of stroke prevention guidelines, personal risk factors and stroke warning signs via teachback method.  Not met                            Time Tracking:     OT Date of Treatment: 10/14/19  OT Start Time:  0812  OT Stop Time: 0859  OT Total Time (min): 47 min    Billable Minutes:Self Care/Home Management 30  Neuromuscular Re-education 17    MAMTA Sandoval  10/14/2019

## 2019-10-14 NOTE — HOSPITAL COURSE
10/13/19: Evaluated by PT & OT. Bed mobility SBA- modA. Sit to stand Arias- HHA. Ambulated 20 ft modA x 2- HHA. Grooming, UBD, & LBD modA.

## 2019-10-14 NOTE — ASSESSMENT & PLAN NOTE
59 y F with HTN, CAD (PCI x2 in 2011), CHF (EF 65%), and asthma presented 2 days ago with R hemispheric stroke (L facial droop, dysarthria, L hemiplegia, NIHSS 12), now improved to NIHSS 8. Overall poor surgical candidate with MI and PCI/stent x2 in 2011 and significant SOB with walking several steps uphill. MRA and US both consistent with R ICA occlusion. L ICA with 60-70% stenosis. No intervention recommended. Please have her follow up in clinic for monitoring of her L ICA stenosis.

## 2019-10-14 NOTE — ASSESSMENT & PLAN NOTE
58 yo woman with PMHx HTN, CAD, CHF, asthma who presented to [facility] with acute onset of L-sided weakness, slurred speech, and L facial droop. Telestroke completed an no tPA recommended as outside treatment window. Outside CTA then did not reveal an LVO so pt not a candidate for acute IR intervention, but had concerns for high-grade R ICA stenosis. Admitted for acute stroke workup.    MRI Brain demonstrating acute R MCA distribution infarct. Unclear source of lesion (outside CD) - MRA here showing what appears to be R ICA extracranial high-grade stenosis but also an abrupt lack of flow void in distal ICA/cavernous segment, suggesting possible thrombus, good flow from COW collaterals with additional R MCA M1 abrupt occlusion. Overall, favor R ICA origin disease w/ artery to artery athero-embolism, though mild L atrial enlargement seen on echo as well so cannot definitively rule out ESUS process as well.     VAS US Carotids concerning for R ICA occlusion with reconstituted flow as well as L ICA stenosis; consulted with Vascular Surgery team (see below.) Started IVFs to bolster BP for good cerebral perfusion.      Antithrombotics for secondary stroke prevention: Antiplatelets: Aspirin: 81 mg daily  Clopidogrel: 75 mg daily  Statins for secondary stroke prevention and hyperlipidemia, if present:   Statins: Atorvastatin- 40 mg daily  Aggressive risk factor modification: HTN, HLD, Diet, Exercise  Rehab efforts: The patient has been evaluated by a stroke team provider and the therapy needs have been fully considered based off the presenting complaints and exam findings. The following therapy evaluations are needed: PT evaluate and treat, OT evaluate and treat, SLP evaluate and treat, PM&R evaluate for appropriate placement - Dispo rehab  Diagnostics ordered/pending: None   VTE prophylaxis: Heparin 5000 units SQ every 8 hours and mechanical SCDs  BP parameters: carotid stenosis: No restriction if asymptomatic

## 2019-10-14 NOTE — ASSESSMENT & PLAN NOTE
Stroke risk factor  CTA at outside facility reported R ICA high grade stenosis. As seen on MRA here as well.  VAS US Carotids 10/14 with evidence of R ICA occlusion and L ICA stenosis.   Discussed with Vascular Surgery team who requested formal consult, re: inpatient vs outpatient intervention vs medical management.  Started IVFs to bolster BP for good cerebral perfusion in the setting of carotid stenosis.  DAPT + statin

## 2019-10-14 NOTE — SIGNIFICANT EVENT
Patient was safely transferred from bed to bedside commode with significant weakness to left side noted s/p stroke. Instructed patient to remain seated on the commode chair so I can change the bed pads, verbalized understanding. While changing the pads, saw patient sliding off the commode chair to the floor on her left side. Caught the patient in time and eased her to the floor then transferred her back to bed. Upon assessment, neuro status intact, VSS and documented in flow sheet. Patient denied hitting head or having any pain at this time. No signs of injury noted. Reinforced not to get OOB without assistance, verbalized understanding. Fall precautions maintained, bed alarm armed and bed in lowest position. Will continue to monitor.    21:15 - Notified Fariha Ladd NP with the stroke team of patient's fall, no new order received at this time. Charge nurse also notified of fall.

## 2019-10-14 NOTE — PLAN OF CARE
10/14/19 1453   Post-Acute Status   Post-Acute Authorization Placement   Post-Acute Placement Status Pending Payor Review       Auth submitted by [employer].  SW in contact with CM and Medical staff. Will continue to follow and offer support as needed.     Shaun Rios LMSW  [facility]   Ext. [phone]

## 2019-10-14 NOTE — SUBJECTIVE & OBJECTIVE
Past Medical History:   Diagnosis Date    Coronary artery disease     Hypertension      Past Surgical History:   Procedure Laterality Date    CARDIAC SURGERY       Review of patient's allergies indicates:  No Known Allergies    Scheduled Medications:    aspirin  81 mg Oral Daily    atorvastatin  40 mg Oral Daily    clopidogrel  75 mg Oral Daily    heparin (porcine)  5,000 Units Subcutaneous Q8H    pantoprazole  40 mg Oral Daily       PRN Medications: acetaminophen, labetalol, sodium chloride 0.9%    Family History     None        Tobacco Use    Smoking status: Never Smoker    Smokeless tobacco: Never Used   Substance and Sexual Activity    Alcohol use: Yes    Drug use: Not on file    Sexual activity: Not on file     Review of Systems   Constitutional: Negative for fatigue and fever.   HENT: Negative for sore throat and trouble swallowing.    Eyes: Negative for visual disturbance.   Respiratory: Negative for cough and shortness of breath.    Cardiovascular: Negative for chest pain and leg swelling.   Gastrointestinal: Negative for abdominal distention and abdominal pain.   Genitourinary: Negative for difficulty urinating.   Musculoskeletal: Positive for gait problem. Negative for back pain.   Skin: Negative for color change.   Neurological: Positive for weakness. Negative for dizziness, light-headedness and headaches.   Psychiatric/Behavioral: Negative for agitation and confusion.     Objective:     Vital Signs (Most Recent):  Temp: 96.3 °F (35.7 °C) (10/14/19 1300)  Pulse: 100 (10/14/19 1300)  Resp: 20 (10/14/19 1300)  BP: 136/82 (10/14/19 1300)  SpO2: 95 % (10/14/19 1300)    Vital Signs (24h Range):  Temp:  [96.3 °F (35.7 °C)-98.2 °F (36.8 °C)] 96.3 °F (35.7 °C)  Pulse:  [] 100  Resp:  [18-20] 20  SpO2:  [94 %-97 %] 95 %  BP: ()/(64-82) 136/82     Body mass index is 25.63 kg/m².    Physical Exam   Constitutional: She appears well-developed and well-nourished.   HENT:   Head: Normocephalic  and atraumatic.   Eyes: Pupils are equal, round, and reactive to light. EOM are normal.   Neck: Neck supple.   Pulmonary/Chest: Effort normal. No respiratory distress.   Abdominal: Normal appearance.   Neurological:   - alert, following commands  - L sided weakness  - facial droop   Skin: Skin is warm and dry.   Psychiatric: She has a normal mood and affect.   Nursing note and vitals reviewed.    NEUROLOGICAL EXAMINATION:     CRANIAL NERVES     CN III, IV, VI   Pupils are equal, round, and reactive to light.  Extraocular motions are normal.       Diagnostic Results:   Labs: Reviewed   EKG: Reviewed   CT: Reviewed

## 2019-10-14 NOTE — CONSULTS
[facility]  Vascular Surgery  Consult Note    Inpatient consult to Vascular Surgery  Consult performed by: Farrukh Henson MD  Consult ordered by: Arcelia Bazzi PA-C        Subjective:     Chief Complaint/Reason for Admission: R ICA occlusion    History of Present Illness: 59 y F with HTN, CAD (PCI x2 in 2011), CHF (EF 65%), and asthma presented 2 days ago with dysarthria, facial droop, left-sided hemiplegia.  She was noted to have severe right ICA stenosis vs occlusion on MRA with M1 occlusion. NIHSS 12 on presentation.  No intervention done.  She has shown improvement, now with LUE and LLE weakness. Current NIHSS 8.  No prior stroke.  She notes she had intermittent chest pain and significant shortness of breath with climbing several steps of stairs.  Carotid ultrasound showed an occlusion of her right proximal ICA, with moderate stenosis of her left ICA.  She was previously on aspirin and Lipitor, but not Plavix.  She stopped smoking 20 years ago.      No medications prior to admission.       Review of patient's allergies indicates:  No Known Allergies    Past Medical History:   Diagnosis Date    Coronary artery disease     Hypertension      Past Surgical History:   Procedure Laterality Date    CARDIAC SURGERY       Family History     None        Tobacco Use    Smoking status: Never Smoker    Smokeless tobacco: Never Used   Substance and Sexual Activity    Alcohol use: Yes    Drug use: Not on file    Sexual activity: Not on file     Review of Systems   All other systems reviewed and are negative.    Objective:     Vital Signs (Most Recent):  Temp: 96.3 °F (35.7 °C) (10/14/19 1300)  Pulse: 102 (10/14/19 1516)  Resp: 20 (10/14/19 1300)  BP: 136/82 (10/14/19 1300)  SpO2: 95 % (10/14/19 1300) Vital Signs (24h Range):  Temp:  [96.3 °F (35.7 °C)-98.2 °F (36.8 °C)] 96.3 °F (35.7 °C)  Pulse:  [] 102  Resp:  [18-20] 20  SpO2:  [94 %-97 %] 95 %  BP: ()/(64-82) 136/82     Weight: 69.9  kg (154 lb)  Body mass index is 25.63 kg/m².    Physical Exam   Constitutional: She appears well-developed and well-nourished.   HENT:   Head: Normocephalic and atraumatic.   Eyes: Pupils are equal, round, and reactive to light. EOM are normal.   Neck: Normal range of motion. Neck supple.   Cardiovascular: Normal rate and regular rhythm.   Pulmonary/Chest: Effort normal. No respiratory distress.   Abdominal: Soft. She exhibits no distension.   Musculoskeletal:   LUE 1/5  LLE 2/5  RUE 5/5  RLE 5/5   Neurological:   L facial droop  Mild dysarthria  No aphasia       Significant Labs:  CBC:   Recent Labs   Lab 10/14/19  0728   WBC 10.01   RBC 4.25   HGB 12.3   HCT 38.5      MCV 91   MCH 28.9   MCHC 31.9*     CMP:   Recent Labs   Lab 10/14/19  0449   *   CALCIUM 9.2   ALBUMIN 3.4*   PROT 6.7      K 3.8   CO2 26      BUN 16   CREATININE 0.8   ALKPHOS 73   ALT 19   AST 22   BILITOT 0.4       Significant Diagnostics:  I have reviewed all pertinent imaging results/findings within the past 24 hours.   MRA high grade ICA stenosis vs occlusion  US: proximal R ICA occlusion, L ICA stenossi 60-79%    Assessment/Plan:     * Embolic stroke involving right middle cerebral artery  59 y F with HTN, CAD (PCI x2 in 2011), CHF (EF 65%), and asthma presented 2 days ago with R hemispheric stroke (L facial droop, dysarthria, L hemiplegia, NIHSS 12), now improved to NIHSS 8. Overall poor surgical candidate with MI and PCI/stent x2 in 2011 and significant SOB with walking several steps uphill. MRA and US both consistent with R ICA occlusion. L ICA with 60-70% stenosis. No intervention recommended. Please have her follow up in clinic for monitoring of her L ICA stenosis.        Thank you for your consult. I will sign off. Please contact us if you have any additional questions.    Farrukh Henson MD  Vascular Surgery  [facility]

## 2019-10-14 NOTE — PLAN OF CARE
Goals remain appropriate.  MAMTA Sandoval  10/14/2019    Problem: Occupational Therapy Goal  Goal: Occupational Therapy Goal  Description  Goals set 10/13 to be addressed for 14 days with expiration date, 10/27:  Patient will increase functional independence with ADLs by performing:    Patient will demonstrate rolling to the right with SBA assist.  Not met   Patient will demonstrate rolling to the left with modified independence.   Not met  Patient will demonstrate supine -sit with SBA.   Not met  Patient will demonstrate stand pivot transfers with CGA.   Not met  Patient will demonstrate grooming while standing with CGA.   Not met  Patient will demonstrate upper body dressing with min assist while seated EOB.   Not met  Patient will demonstrate lower body dressing with min assist while seated EOB.   Not met  Patient will demonstrate toileting with min assist.   Not met  Patient will demonstrate bathing while seated EOB with min assist.   Not met  Patient's family / caregiver will demonstrate independence and safety with assisting patient with self-care skills and functional mobility.     Not met  Patient's family / caregiver will demonstrate independence with providing ROM and changes in bed positioning.   Not met  Patient and/or patient's family will verbalize understanding of stroke prevention guidelines, personal risk factors and stroke warning signs via teachback method.  Not met           Outcome: Ongoing, Progressing

## 2019-10-14 NOTE — CONSULTS
[facility]  Physical Medicine & Rehab  Consult Note    Patient Name: Karen Ellison  MRN: [id number]  Admission Date: 10/12/2019  Hospital Length of Stay: 2 days  Attending Physician: Jean Carlos Pierre MD    Inpatient consult to Physical Medicine & Rehabilitation  Consult performed by: Arlene Sands NP  Consult requested by:  Jean Carlos Pierre MD    Collaborating Physician: Ene Hernández MD  Reason for Consult:  Assess rehabilitation needs     Consults  Subjective:     Principal Problem: Embolic stroke involving right middle cerebral artery    HPI: Karen Ellison is a 59-year-old female with PMHx of CAD and HTN. Patient presented to [facility] w/ L sided weakness and slurred speech. CTA conformed no LVO. Telestroke completed and transferred to [facility] 10/12/19. MRI/MRA with R M1 occlusion, no interventions.     Functional History: Patient liveslives with her spouse in a house with [address]..  Prior to admission, (I). DME: none.     Hospital Course: 10/13/19: Evaluated by PT & OT. Bed mobility SBA- modA. Sit to stand Arias- HHA. Ambulated 20 ft modA x 2- HHA. Grooming, UBD, & LBD modA.       Past Medical History:   Diagnosis Date    Coronary artery disease     Hypertension      Past Surgical History:   Procedure Laterality Date    CARDIAC SURGERY       Review of patient's allergies indicates:  No Known Allergies    Scheduled Medications:    aspirin  81 mg Oral Daily    atorvastatin  40 mg Oral Daily    clopidogrel  75 mg Oral Daily    heparin (porcine)  5,000 Units Subcutaneous Q8H    pantoprazole  40 mg Oral Daily       PRN Medications: acetaminophen, labetalol, sodium chloride 0.9%    Family History     Unknown per pt        Tobacco Use    Smoking status: Never Smoker    Smokeless tobacco: Never Used   Substance and Sexual Activity    Alcohol use: Yes    Drug use: Not on file    Sexual activity: Not on file     Review of Systems   Constitutional: Negative for fatigue and fever.   HENT:  Negative for sore throat and trouble swallowing.    Eyes: Negative for visual disturbance.   Respiratory: Negative for cough and shortness of breath.    Cardiovascular: Negative for chest pain and leg swelling.   Gastrointestinal: Negative for abdominal distention and abdominal pain.   Genitourinary: Negative for difficulty urinating.   Musculoskeletal: Positive for gait problem. Negative for back pain.   Skin: Negative for color change.   Neurological: Positive for weakness. Negative for dizziness, light-headedness and headaches.   Psychiatric/Behavioral: Negative for agitation and confusion.     Objective:     Vital Signs (Most Recent):  Temp: 96.3 °F (35.7 °C) (10/14/19 1300)  Pulse: 100 (10/14/19 1300)  Resp: 20 (10/14/19 1300)  BP: 136/82 (10/14/19 1300)  SpO2: 95 % (10/14/19 1300)    Vital Signs (24h Range):  Temp:  [96.3 °F (35.7 °C)-98.2 °F (36.8 °C)] 96.3 °F (35.7 °C)  Pulse:  [] 100  Resp:  [18-20] 20  SpO2:  [94 %-97 %] 95 %  BP: ()/(64-82) 136/82     Body mass index is 25.63 kg/m².    Physical Exam   Constitutional: She appears well-developed and well-nourished.   HENT:   Head: Normocephalic and atraumatic.   Eyes: Pupils are equal, round, and reactive to light. EOM are normal.   Neck: Neck supple.   Pulmonary/Chest: Effort normal. No respiratory distress.   Abdominal: Normal appearance.   Neurological:   - alert, following commands  - L sided weakness  - facial droop   Skin: Skin is warm and dry.   Psychiatric: She has a normal mood and affect.   Nursing note and vitals reviewed.    Diagnostic Results:   Labs: Reviewed   EKG: Reviewed   CT: Reviewed    Assessment/Plan:     * Embolic stroke involving right middle cerebral artery  - CTA conformed no LVO.  -  MRI/MRA with R M1 occlusion, no interventions.    See hospital course for functional, cognitive/speech/language, and nutrition/swallow status.      Recommendations  -  Encourage mobility, OOB in chair at least 3 hours per day, and early  ambulation as appropriate   -  PT/OT evaluate and treat  -  SLP speech and cognitive evaluate and treat  -  Monitor sleep disturbances and establish consistent sleep-wake cycle  -  Monitor for bowel and bladder dysfunction  -  Monitor for shoulder pain and subluxation  -  Monitor for spasticity  -  Monitor for and prevent skin breakdown and pressure ulcers  · Early mobility, repositioning/weight shifting every 20-30 minutes when sitting, turn patient every 2 hours, proper mattress/overlay and chair cushioning, pressure relief/heel protector boots  -  DVT prophylaxis  -  Reviewed discharge options (IP rehab, SNF, HH therapy, and OP therapy)    Essential hypertension  - stroke risk factor    Participating with therapy. Will follow progress and discuss with rehab team for post acute care/rehab recommendation.    Thank you for your consult.     Arlene Sands NP  Department of Physical Medicine & Rehab  [facility]

## 2019-10-14 NOTE — ASSESSMENT & PLAN NOTE
Stroke risk factor  SBP goal 160-180; Range  over last 24 hrs  Started IVFs to bolster BP for good cerebral perfusion.   Will continue to monitor

## 2019-10-14 NOTE — PLAN OF CARE
PCP is Dr. Efrain Velez in [place], [place] with [facility].    Preferred pharmacy is [facility], formerly [facility] located at [address] [place] [address] [place] [address].       10/14/19 1213   Discharge Assessment   Assessment Type Discharge Planning Assessment   Confirmed/corrected address and phone number on facesheet? Yes   Assessment information obtained from? Patient   Prior to hospitilization cognitive status: Alert/Oriented   Prior to hospitalization functional status: Assistive Equipment   Current cognitive status: Alert/Oriented   Current Functional Status: Assistive Equipment   Facility Arrived From: transfer from [facility]   Lives With spouse  (2 dogs)   Able to Return to Prior Arrangements yes   Is patient able to care for self after discharge? Yes   Who are your caregiver(s) and their phone number(s)? José Manuel Ellison (spouse) [phone]   Patient's perception of discharge disposition admitted as an inpatient   Readmission Within the Last 30 Days no previous admission in last 30 days   Patient currently being followed by outpatient case management? No   Patient currently receives any other outside agency services? No   Equipment Currently Used at Home oxygen   Do you have any problems affording any of your prescribed medications? No   Is the patient taking medications as prescribed? yes   Does the patient have transportation home? Yes   Transportation Anticipated family or friend will provide   Discharge Plan A Other  (Return to [facility])   Discharge Plan B Rehab   DME Needed Upon Discharge    (TBD)   Patient/Family in Agreement with Plan yes     Nilda Claros LMSW  [facility]

## 2019-10-14 NOTE — SUBJECTIVE & OBJECTIVE
No medications prior to admission.       Review of patient's allergies indicates:  No Known Allergies    Past Medical History:   Diagnosis Date    Coronary artery disease     Hypertension      Past Surgical History:   Procedure Laterality Date    CARDIAC SURGERY       Family History     None        Tobacco Use    Smoking status: Never Smoker    Smokeless tobacco: Never Used   Substance and Sexual Activity    Alcohol use: Yes    Drug use: Not on file    Sexual activity: Not on file     Review of Systems   All other systems reviewed and are negative.    Objective:     Vital Signs (Most Recent):  Temp: 96.3 °F (35.7 °C) (10/14/19 1300)  Pulse: 102 (10/14/19 1516)  Resp: 20 (10/14/19 1300)  BP: 136/82 (10/14/19 1300)  SpO2: 95 % (10/14/19 1300) Vital Signs (24h Range):  Temp:  [96.3 °F (35.7 °C)-98.2 °F (36.8 °C)] 96.3 °F (35.7 °C)  Pulse:  [] 102  Resp:  [18-20] 20  SpO2:  [94 %-97 %] 95 %  BP: ()/(64-82) 136/82     Weight: 69.9 kg (154 lb)  Body mass index is 25.63 kg/m².    Physical Exam   Constitutional: She appears well-developed and well-nourished.   HENT:   Head: Normocephalic and atraumatic.   Eyes: Pupils are equal, round, and reactive to light. EOM are normal.   Neck: Normal range of motion. Neck supple.   Cardiovascular: Normal rate and regular rhythm.   Pulmonary/Chest: Effort normal. No respiratory distress.   Abdominal: Soft. She exhibits no distension.   Musculoskeletal:   LUE 1/5  LLE 2/5  RUE 5/5  RLE 5/5   Neurological:   L facial droop  Mild dysarthria  No aphasia       Significant Labs:  CBC:   Recent Labs   Lab 10/14/19  0728   WBC 10.01   RBC 4.25   HGB 12.3   HCT 38.5      MCV 91   MCH 28.9   MCHC 31.9*     CMP:   Recent Labs   Lab 10/14/19  0449   *   CALCIUM 9.2   ALBUMIN 3.4*   PROT 6.7      K 3.8   CO2 26      BUN 16   CREATININE 0.8   ALKPHOS 73   ALT 19   AST 22   BILITOT 0.4       Significant Diagnostics:  I have reviewed all pertinent imaging  results/findings within the past 24 hours.   MRA high grade ICA stenosis vs occlusion  US: proximal R ICA occlusion, L ICA stenossi 60-79%

## 2019-10-14 NOTE — PLAN OF CARE
10/14/19 1049   Post-Acute Status   Post-Acute Authorization Placement   Post-Acute Placement Status Referrals Sent       Referral made to [facility]. Pt came from that hospital.  Awaiting acceptance.  SW in contact with CM and Medical staff. Will continue to follow and offer support as needed.     Shaun Rios, SHANELLE  [facility]   Ext. [phone]

## 2019-10-14 NOTE — ASSESSMENT & PLAN NOTE
Area of cytotoxic cerebral edema identified when reviewing brain imaging in the territory of the R middle cerebral artery. There is not mass effect associated with it. We will continue to monitor the patients clinical exam for any worsening of symptoms which may indicate expansion of the stroke or the area of the edema resulting in the clinical change. The pattern is suggestive of a large artery atheroembolic etiology.

## 2019-10-14 NOTE — ASSESSMENT & PLAN NOTE
- CTA conformed no LVO.  -  MRI/MRA with R M1 occlusion, no interventions.    See hospital course for functional, cognitive/speech/language, and nutrition/swallow status.      Recommendations  -  Encourage mobility, OOB in chair at least 3 hours per day, and early ambulation as appropriate   -  PT/OT evaluate and treat  -  SLP speech and cognitive evaluate and treat  -  Monitor sleep disturbances and establish consistent sleep-wake cycle  -  Monitor for bowel and bladder dysfunction  -  Monitor for shoulder pain and subluxation  -  Monitor for spasticity  -  Monitor for and prevent skin breakdown and pressure ulcers  · Early mobility, repositioning/weight shifting every 20-30 minutes when sitting, turn patient every 2 hours, proper mattress/overlay and chair cushioning, pressure relief/heel protector boots  -  DVT prophylaxis  -  Reviewed discharge options (IP rehab, SNF, HH therapy, and OP therapy)

## 2019-10-14 NOTE — HPI
59 y F with HTN, CAD (PCI x2 in 2011), CHF (EF 65%), and asthma presented 2 days ago with dysarthria, facial droop, left-sided hemiplegia.  She was noted to have severe right ICA stenosis vs occlusion on MRA with M1 occlusion. NIHSS 12 on presentation.  No intervention done.  She has shown improvement, now with LUE and LLE weakness. Current NIHSS 8.  No prior stroke.  She notes she had intermittent chest pain and significant shortness of breath with climbing several steps of stairs.  Carotid ultrasound showed an occlusion of her right proximal ICA, with moderate stenosis of her left ICA.  She was previously on aspirin and Lipitor, but not Plavix.  She stopped smoking 20 years ago.

## 2019-10-14 NOTE — ASSESSMENT & PLAN NOTE
Pt slid off bedside commode 10/13 evening, c/o L shoulder pain 10/14 AM  XR 10/14 with no evidence of fracture or other acute process.   Tylenol PRN, nurse to apply ice packs

## 2019-10-14 NOTE — NURSING
A O x 4. Stable. To xray for view of affected shoulder. Pt. Has been out of bed to BSC. Voiding well x4 and has had 2 Bowel movements today.  Will continue to monitor.

## 2019-10-15 PROCEDURE — 99233 SBSQ HOSP IP/OBS HIGH 50: CPT | Mod: ,,, | Performed by: PSYCHIATRY & NEUROLOGY

## 2019-10-15 PROCEDURE — 97535 SELF CARE MNGMENT TRAINING: CPT

## 2019-10-15 PROCEDURE — 97116 GAIT TRAINING THERAPY: CPT

## 2019-10-15 PROCEDURE — 63600175 PHARM REV CODE 636 W HCPCS: Performed by: PHYSICIAN ASSISTANT

## 2019-10-15 PROCEDURE — 97530 THERAPEUTIC ACTIVITIES: CPT

## 2019-10-15 PROCEDURE — 25000003 PHARM REV CODE 250: Performed by: NURSE PRACTITIONER

## 2019-10-15 PROCEDURE — 99233 PR SUBSEQUENT HOSPITAL CARE,LEVL III: ICD-10-PCS | Mod: ,,, | Performed by: PSYCHIATRY & NEUROLOGY

## 2019-10-15 PROCEDURE — 63600175 PHARM REV CODE 636 W HCPCS: Performed by: NURSE PRACTITIONER

## 2019-10-15 PROCEDURE — 92507 TX SP LANG VOICE COMM INDIV: CPT

## 2019-10-15 PROCEDURE — 25000003 PHARM REV CODE 250: Performed by: PHYSICIAN ASSISTANT

## 2019-10-15 PROCEDURE — 99232 SBSQ HOSP IP/OBS MODERATE 35: CPT | Mod: ,,, | Performed by: NURSE PRACTITIONER

## 2019-10-15 PROCEDURE — 20600001 HC STEP DOWN PRIVATE ROOM

## 2019-10-15 PROCEDURE — 99232 PR SUBSEQUENT HOSPITAL CARE,LEVL II: ICD-10-PCS | Mod: ,,, | Performed by: NURSE PRACTITIONER

## 2019-10-15 RX ORDER — SODIUM CHLORIDE 9 MG/ML
INJECTION, SOLUTION INTRAVENOUS CONTINUOUS
Status: ACTIVE | OUTPATIENT
Start: 2019-10-15 | End: 2019-10-15

## 2019-10-15 RX ORDER — RAMELTEON 8 MG/1
8 TABLET ORAL NIGHTLY PRN
Status: DISCONTINUED | OUTPATIENT
Start: 2019-10-15 | End: 2019-10-16 | Stop reason: HOSPADM

## 2019-10-15 RX ADMIN — HEPARIN SODIUM 5000 UNITS: 5000 INJECTION, SOLUTION INTRAVENOUS; SUBCUTANEOUS at 05:10

## 2019-10-15 RX ADMIN — CLOPIDOGREL BISULFATE 75 MG: 75 TABLET ORAL at 09:10

## 2019-10-15 RX ADMIN — SODIUM CHLORIDE: 0.9 INJECTION, SOLUTION INTRAVENOUS at 09:10

## 2019-10-15 RX ADMIN — HEPARIN SODIUM 5000 UNITS: 5000 INJECTION, SOLUTION INTRAVENOUS; SUBCUTANEOUS at 09:10

## 2019-10-15 RX ADMIN — PANTOPRAZOLE SODIUM 40 MG: 40 TABLET, DELAYED RELEASE ORAL at 09:10

## 2019-10-15 RX ADMIN — ACETAMINOPHEN 650 MG: 325 TABLET ORAL at 01:10

## 2019-10-15 RX ADMIN — ATORVASTATIN CALCIUM 40 MG: 20 TABLET, FILM COATED ORAL at 09:10

## 2019-10-15 RX ADMIN — SODIUM CHLORIDE: 0.9 INJECTION, SOLUTION INTRAVENOUS at 03:10

## 2019-10-15 RX ADMIN — RAMELTEON 8 MG: 8 TABLET ORAL at 09:10

## 2019-10-15 RX ADMIN — ASPIRIN 81 MG: 81 TABLET, COATED ORAL at 09:10

## 2019-10-15 NOTE — PLAN OF CARE
Problem: SLP Goal  Goal: SLP Goal  Description  Speech Language Pathology Goals  Goals expected to be met by 10/21:  1. Pt will tolerate mechanical soft diet and thin liquids while utilizing strategies to manage pocketing and reduce risks of aspiration.  2. Pt will perform OME's x 5-10 to increase oral motor strength, ROM, and function.   3. Pt will participate in speech/language/cognitive evaluation to determine appropriate goals. Goal met 10/14:  ADDITIONAL GOALS:  3. Pt will complete delayed memory tasks with 90% accuracy given min cues.  4. Pt will utilize motor speech strategies during conversational speech to improve quality of speech/articulation.   5. Pt will participate in ongoing assessment of writing, math/time/money management, and visual spatial/left attention to determine need for further intervention.-met       Outcome: Ongoing, Progressing    Pt making progress towards meeting goals.  Rec: cont SLP services in IRF. Cont POC.   ODALIS Harris, CCC-SLP  Speech Language Pathologist  [phone]  10/15/2019

## 2019-10-15 NOTE — PLAN OF CARE
10/15/19 1212   Post-Acute Status   Post-Acute Authorization Placement   Post-Acute Placement Status Accepted Pending Bed Availability       Pt has been accepted by [facility] and [facility]. Will d/c tomorrow to rehab.  SW in contact with CM and Medical staff. Will continue to follow and offer support as needed.     Shaun Rios, SHANELLE  [facility]   Ext. [phone]

## 2019-10-15 NOTE — PROGRESS NOTES
[facility]  Vascular Neurology  Comprehensive Stroke Center  Progress Note    Assessment/Plan:     * Embolic stroke involving right middle cerebral artery  58 yo woman with PMHx HTN, CAD, CHF, asthma who presented to [facility] with acute onset of L-sided weakness, slurred speech, and L facial droop. Telestroke completed an no tPA recommended as outside treatment window. Outside CTA then did not reveal an LVO so pt not a candidate for acute IR intervention, but had concerns for high-grade R ICA stenosis. Admitted for acute stroke workup.    MRI Brain demonstrating acute R MCA distribution infarct. Unclear source of lesion (outside CD) - MRA here showing what appears to be R ICA extracranial high-grade stenosis but also an abrupt lack of flow void in distal ICA/cavernous segment, suggesting possible thrombus, good flow from COW collaterals with additional R MCA M1 abrupt occlusion. VAS US Carotids concerning for R ICA occlusion with reconstituted flow as well as L ICA stenosis. Vascular Surgery saw the patient and is electing for medical management with ambulatory surveillance of her L ICA (see below.)  Overall, favor R ICA origin disease w/ artery to artery athero-embolism, though mild L atrial enlargement seen on echo as well so cannot definitively rule out ESUS process as well.     Patient remains medically and neurologically stable; plans for d/c to inpatient rehab in the morning.      Antithrombotics for secondary stroke prevention: Antiplatelets: Aspirin: 81 mg daily  Clopidogrel: 75 mg daily  Statins for secondary stroke prevention and hyperlipidemia, if present:   Statins: Atorvastatin- 40 mg daily  Aggressive risk factor modification: HTN, HLD, Diet, Exercise  Rehab efforts: The patient has been evaluated by a stroke team provider and the therapy needs have been fully considered based off the presenting complaints and exam findings. The following therapy evaluations are needed: PT evaluate and  treat, OT evaluate and treat, SLP evaluate and treat, PM&R evaluate for appropriate placement - Dispo rehab  Diagnostics ordered/pending: None   VTE prophylaxis: Heparin 5000 units SQ every 8 hours and mechanical SCDs  BP parameters: carotid stenosis: No restriction if asymptomatic     Internal carotid artery stenosis, bilateral  Stroke risk factor  CTA at outside facility reported R ICA high grade stenosis. As seen on MRA here as well.  VAS US Carotids 10/14 with evidence of R ICA occlusion and L ICA stenosis.   Consulted Vascular Surgery; Appreciate recs. Plans for medical management, no acute intervention for now  Will order Ambulatory referral to Vascular Surgery at discharge for monitoring of her L ICA stenosis.  Continuing IVFs today but encouraged patient to focus on increasing PO hydration to bolster BP for good cerebral perfusion in the setting of carotid stenosis.  DAPT + statin     Essential hypertension  Stroke risk factor  SBP goal 160-180; Range 117-144, improved over last 24 hrs  Continuing IVFs today but encouraged patient to focus on increasing PO hydration to bolster BP for good cerebral perfusion.   Will continue to monitor    Acute pain of left shoulder  Pt slid off bedside commode 10/13 evening, c/o L shoulder pain 10/14 AM  XR 10/14 with no evidence of fracture or other acute process.   Tylenol PRN, nurse to apply ice packs    Cytotoxic cerebral edema  Area of cytotoxic cerebral edema identified when reviewing brain imaging in the territory of the R middle cerebral artery. There is not mass effect associated with it. We will continue to monitor the patients clinical exam for any worsening of symptoms which may indicate expansion of the stroke or the area of the edema resulting in the clinical change. The pattern is suggestive of a large artery atheroembolic etiology.    Mixed hyperlipidemia  Stroke risk factor  LDL 71   Atorvastatin 40 mg        10/12 - R MCA stroke. CTA at outside hospital  with R ICA stenosis. Transferred for higher level of care.   10/13 - MRI/MRA with R M1 occlusion, no interventions out of window. Exam improved, NIH from 12 to 6. US carotids ordered. UA WBCs 7, pt denies symptoms of UTI, a febrile. SLP cleared for mechanical soft diet. Therapy recommending rehab.   10/14: Pt slid off bedside commode yesterday evening, c/o L shoulder pain this AM; XR with no acute process. VAS US Carotids with R ICA occlusion and L ICA stenosis; discussed consult with Vascular Surgery team. Started IVFs to bolster BP for good cerebral perfusion. PRN Tylenol for HA. Dispo rehab.  10/15: Vascular Surgery with no plans for acute intervention; will follow the patient in clinic. Continuing IVFs today but encouraged patient to focus on increasing PO hydration. Ordered PRN Ramelteon per pt request. Plans for d/c to inpatient rehab in the morning.    STROKE DOCUMENTATION   Acute Stroke Times   Last Known Normal Date: 10/12/19  Last Known Normal Time: 0730  Symptom Onset Date: 10/12/19  Symptom Onset Time: 0730  Stroke Team Called Date: 10/12/19  Stroke Team Called Time: 1930  Stroke Team Arrival Date: 10/12/19  Stroke Team Arrival Time: 1935  CT Interpretation Time: (NA)  Decision to Treat Time for Alteplase: (NA)  Decision to Treat Time for IR: (NA)    NIH Scale:  1a. Level of Consciousness: 0-->Alert, keenly responsive  1b. LOC Questions: 0-->Answers both questions correctly  1c. LOC Commands: 0-->Performs both tasks correctly  2. Best Gaze: 1-->Partial gaze palsy, gaze is abnormal in one or both eyes, but forced deviation or total gaze paresis is not present  3. Visual: 0-->No visual loss  4. Facial Palsy: 2-->Partial paralysis (total or near-total paralysis of lower face)  5a. Motor Arm, Left: 1-->Drift, limb holds 90 (or 45) degrees, but drifts down before full 10 seconds, does not hit bed or other support  5b. Motor Arm, Right: 0-->No drift, limb holds 90 (or 45) degrees for full 10 secs  6a. Motor  Leg, Left: 1-->Drift, leg falls by the end of the 5-sec period but does not hit bed  6b. Motor Leg, Right: 0-->No drift, leg holds 30 degree position for full 5 secs  7. Limb Ataxia: 0-->Absent  8. Sensory: 1-->Mild-to-moderate sensory loss, patient feels pinprick is less sharp or is dull on the affected side, or there is a loss of superficial pain with pinprick, but patient is aware of being touched  9. Best Language: 0-->No aphasia, normal  10. Dysarthria: 1-->Mild-to-moderate dysarthria, patient slurs at least some words and, at worst, can be understood with some difficulty  11. Extinction and Inattention (formerly Neglect): 0-->No abnormality  Total (NIH Stroke Scale): 7       Modified Kiowa Score: 1  Henderson Coma Scale:    ABCD2 Score:    [id number] Score:   HAS -BLED Score:   ICH Score:   Hunt & Prakash Classification:      Hemorrhagic change of an Ischemic Stroke: Does this patient have an ischemic stroke with hemorrhagic changes? No     Neurologic Chief Complaint: Left-sided weakness, slurred speech, L facial droop    Subjective:     Interval History: Patient is seen for follow-up neurological assessment and treatment recommendations:   TERRY. Vascular Surgery with no plans for acute intervention; will follow the patient in clinic. Continuing IVFs today but encouraged patient to focus on increasing PO hydration. Ordered PRN Ramelteon per pt request. Plans for d/c to inpatient rehab in the morning.    HPI, Past Medical, Family, and Social History remains the same as documented in the initial encounter.     Review of Systems   Constitutional: Negative for chills and fever.   Gastrointestinal: Negative for nausea and vomiting.   Neurological: Positive for facial asymmetry, speech difficulty, weakness, numbness and headaches.   Psychiatric/Behavioral: Negative for agitation and confusion.     Scheduled Meds:   aspirin  81 mg Oral Daily    atorvastatin  40 mg Oral Daily    clopidogrel  75 mg Oral Daily     heparin (porcine)  5,000 Units Subcutaneous Q8H    pantoprazole  40 mg Oral Daily     Continuous Infusions:   sodium chloride 0.9%       PRN Meds:acetaminophen, labetalol, ramelteon, sodium chloride 0.9%    Objective:     Vital Signs (Most Recent):  Temp: 98 °F (36.7 °C) (10/15/19 1136)  Pulse: 96 (10/15/19 1136)  Resp: 18 (10/15/19 1136)  BP: 131/82 (10/15/19 1136)  SpO2: (!) 92 % (10/15/19 1136)  BP Location: Right arm    Vital Signs Range (Last 24H):  Temp:  [97.4 °F (36.3 °C)-98.6 °F (37 °C)]   Pulse:  []   Resp:  [16-20]   BP: (117-144)/(65-90)   SpO2:  [92 %-97 %]   BP Location: Right arm    Physical Exam   Constitutional: She is oriented to person, place, and time. She appears well-developed and well-nourished. No distress.   HENT:   Head: Normocephalic and atraumatic.   Eyes: Conjunctivae are normal. No scleral icterus.   Cardiovascular: Normal rate.   Pulmonary/Chest: Effort normal. No respiratory distress.   Musculoskeletal: She exhibits no edema or deformity.   Neurological: She is alert and oriented to person, place, and time. A sensory deficit is present.   Skin: Skin is warm and dry.   Psychiatric: She has a normal mood and affect. Her behavior is normal. Cognition and memory are not impaired. She is attentive.   Vitals reviewed.      Neurological Exam:   LOC: alert  Attention Span: Good   Language: No aphasia  Articulation: Dysarthria  Orientation: Person, Place, Time   Visual Fields: Full  EOM (CN III, IV, VI): Gaze preference  right  Facial Movement (CN VII): Lower facial weakness on the Left  Motor: Arm left  Paresis: 3/5  Leg left  Paresis: 4/5  Arm right  Normal 5/5  Leg right Normal 5/5  Sensation: Carlos-hypoesthesia left  Tone: Normal tone throughout    Laboratory:  CMP:   No results for input(s): GLUCOSE, CALCIUM, ALBUMIN, PROT, NA, K, CO2, CL, BUN, CREATININE, ALKPHOS, ALT, AST, BILITOT in the last 24 hours.  BMP:   Recent Labs   Lab 10/14/19  0449      K 3.8      CO2 26    BUN 16   CREATININE 0.8   CALCIUM 9.2       Diagnostic Results     Brain imaging:     MRI Brain WO Contrast 10/13/19    Acute right MCA distribution infarction predominantly involving the right basal ganglia.  No evidence of hemorrhagic conversion.  Partially absent right ICA and MCA flow voids likely representing vascular occlusions corresponding to abnormal findings on MRA reported separately.      CT Head 10/12/19  Hypoattenuation in a right MCA distribution with loss of gray-white differentiation and hyperdense MCA sign concerning for acute stroke with right MCA thrombus.      Vessel Imaging:    VAS US Carotids MACI 10/14/19  Impression:   RIGHT SIDE:   The proximal right ICA appears to be occluded. Retrograde branches are noted reconstituting flow into the mid/distal ICA.   Calcification of the right internal carotid artery.   Heterogeneous plaque in the right common carotid artery, which appears to have blunted flow.   Antegrade flow in the right vertebral artery.  LEFT SIDE:  60-79% Left ICA stenosis.  Calcification of the left internal carotid artery.  Heterogeneous plaque in the left common carotid artery.   Antegrade flow in the left vertebral artery.    MRA Brain/Neck 10/13/19  Marked narrowing of the visualized right internal carotid artery through the skull base with occlusion of the mid right M1 segment of the MCA.  The right common carotid and internal carotid artery are diminutive with minimal flow throughout. There may be segmental sections of trickle flow. The right internal carotid artery is quite diminutive at the entrance to the skull base and cavernous sinus. The appearance suggest diffuse disease potential with thrombosis.  No definite findings for dissection.  The left carotid and bilateral vertebral arteries appear normal.    CTA Head/Neck at outside facility   R ICA stenosis 99%  L ICA stenosis 70%       Cardiac Evaluation:     TTE 10/13/19  · Normal left ventricular systolic function. The  estimated ejection fraction is 65%.  · Normal right ventricular systolic function.  · Indeterminate left ventricular diastolic function.  · Mild left atrial enlargement.  · No wall motion abnormalities.      Arcelia Bazzi PA-C  [facility]  Department of Vascular Neurology   [facility]

## 2019-10-15 NOTE — ASSESSMENT & PLAN NOTE
Stroke risk factor  CTA at outside facility reported R ICA high grade stenosis. As seen on MRA here as well.  VAS US Carotids 10/14 with evidence of R ICA occlusion and L ICA stenosis.   Consulted Vascular Surgery; Appreciate recs. Plans for medical management, no acute intervention for now  Will order Ambulatory referral to Vascular Surgery at discharge for monitoring of her L ICA stenosis.  Continuing IVFs today but encouraged patient to focus on increasing PO hydration to bolster BP for good cerebral perfusion in the setting of carotid stenosis.  DAPT + statin

## 2019-10-15 NOTE — ASSESSMENT & PLAN NOTE
- VAS US- L ICA with 60-70% stenosis.   - No intervention recommended per Vasx sx. F/U in clinic with Vas sx

## 2019-10-15 NOTE — PLAN OF CARE
Problem: Physical Therapy Goal  Goal: Physical Therapy Goal  Description  Goals to be met by: 10/27/2019     Patient will increase functional independence with mobility by performin. Supine to sit with Stand-by Assistance  2. Sit to supine with Stand-by Assistance - not met  3. Sit to stand transfer with Stand-by Assistance. - not met  4. Bed to chair transfer with Stand-by Assistance using LRAD. - not met  5. Gait  x 100 feet with Contact Guard Assistance using LRAD.   6. Ascend/descend 14 stair with right Handrails Contact Guard Assistance.     Outcome: Ongoing, Progressing   Continue with plan of care.   Claudette Conner, PT  10/15/2019

## 2019-10-15 NOTE — PT/OT/SLP PROGRESS
Physical Therapy Treatment    Patient Name:  Karen Ellison   MRN:  [id number]    Recommendations:     Discharge Recommendations:  rehabilitation facility   Discharge Equipment Recommendations: shower chair, wheelchair, manual, walker, danielle, commode   Barriers to discharge: Inaccessible home, Decreased caregiver support and patient below functional baseline    Assessment:     Karen Ellison is a 59 y.o. female admitted with a medical diagnosis of Embolic stroke involving right middle cerebral artery.  She presents with the following impairments/functional limitations:  weakness, impaired endurance, impaired self care skills, impaired functional mobilty, impaired balance, gait instability, decreased coordination, decreased upper extremity function, decreased lower extremity function, decreased safety awareness, abnormal tone, decreased ROM, impaired fine motor, impaired coordination. The patient demonstrates L hemiplegia, dysarthria, L facial droop. She demonstrated improvement in independence with transfers and gait- she performed sit to stand with minimum assistance and squat pivot transfer to R with moderate assistance, she ambulated 20' x2 with minimum assistance using hemiwalker and chair follow. PTA the patient was independent with mobility, she also has 20 stairs to enter her home; she is not safe to discharge home at this time.  Based on the patient's progress with therapy, motivation to participate in treatment, and prior level of independence, they are an excellent candidate for inpatient rehabilitation and they would benefit from rehab to maximize their functional potential.      Rehab Prognosis: Good; patient would benefit from acute skilled PT services to address these deficits and reach maximum level of function.    Recent Surgery: * No surgery found *      Plan:     During this hospitalization, patient to be seen 5 x/week to address the identified rehab impairments via gait training, therapeutic  "activities, therapeutic exercises, neuromuscular re-education and progress toward the following goals:    · Plan of Care Expires:  11/12/19    Subjective     Chief Complaint: patient eating lunch, "I'll finish lunch after, I want to get up and do therapy now"   Patient/Family Comments/goals: patient very motivated to ambulate with therapy  Pain/Comfort:  · Pain Rating 1: 0/10(tingling in R toes)      Objective:     Communicated with RN prior to session.  Patient found HOB elevated, friend present at bedside with bed alarm, peripheral IV, telemetry upon PT entry to room.     General Precautions: Standard, aspiration, fall   Orthopedic Precautions:N/A   Braces: N/A     Functional Mobility:    Bed Mobility  Rolling to L: minimum assistance   Supine to Sit:  minimum assistance    Transfers Sit to Stand:  minimum assistance, cues for scooting to edge of chair, cues for wider JORGE ALBERTO, equal weight through legs, cues for hands on arm rest and anterior weight shift  Squat pivot bed to chair, chair<>commode: moderate assistance, cues for hand placement, sequencing   Gait  Gait Distance: 20 ft x2 with hemiwalker  Assistance Level: minimum assistance, chair follow  Description: narrow JORGE ALBERTO, L leg adductor compensation, scissoring of feet, decreased toe clearance, overstriding. Educated on sequencing with hemiwalker, placement of hemiwalker, cues to maintain wider JORGE ALBERTO, heel toe gait, upright posture.           AM-PAC 6 CLICK MOBILITY  Turning over in bed (including adjusting bedclothes, sheets and blankets)?: 3  Sitting down on and standing up from a chair with arms (e.g., wheelchair, bedside commode, etc.): 3  Moving from lying on back to sitting on the side of the bed?: 3  Moving to and from a bed to a chair (including a wheelchair)?: 2  Need to walk in hospital room?: 2  Climbing 3-5 steps with a railing?: 1  Basic Mobility Total Score: 14       Therapeutic Activities and Exercises:   Patient and friend educated on role of " therapy, goals of session, benefits of out of bed mobility. Patient agreeable to mobilize with therapy.  Discussed PT plan of care during hospitalization. Patient educated that they need to call for assistance to mobilize out of bed. Whiteboard updated as appropriate. Patient educated on how their diagnosis impacts their mobility within PT scope of practice.     Patient left up in chair with all lines intact, call button in reach and friend present..    GOALS:   Multidisciplinary Problems     Physical Therapy Goals        Problem: Physical Therapy Goal    Goal Priority Disciplines Outcome Goal Variances Interventions   Physical Therapy Goal     PT, PT/OT Ongoing, Progressing     Description:  Goals to be met by: 10/27/2019     Patient will increase functional independence with mobility by performin. Supine to sit with Stand-by Assistance  2. Sit to supine with Stand-by Assistance - not met  3. Sit to stand transfer with Stand-by Assistance. - not met  4. Bed to chair transfer with Stand-by Assistance using LRAD. - not met  5. Gait  x 100 feet with Contact Guard Assistance using LRAD.   6. Ascend/descend 14 stair with right Handrails Contact Guard Assistance.                      Time Tracking:     PT Received On: 10/15/19  PT Start Time: 1145     PT Stop Time: 1210  PT Total Time (min): 25 min     Billable Minutes: Gait Training 17 and Therapeutic Activity 8    Treatment Type: Treatment  PT/PTA: PT     PTA Visit Number: 0     Claudette Conner, PT  10/15/2019

## 2019-10-15 NOTE — PLAN OF CARE
Patient remained free from falls this shift, AAOx4, no distress noted, ambulated patient to bedside commode, patient is slowly regaining strength, bed wheels locked and in the lowest position, nonskid socks on, patients belongings within reach, bed alarm on, WCTM  Problem: Fall Injury Risk  Goal: Absence of Fall and Fall-Related Injury  Outcome: Ongoing, Progressing

## 2019-10-15 NOTE — PROGRESS NOTES
[facility]  Physical Medicine & Rehab  Progress Note    Patient Name: Karen Ellison  MRN: [id number]  Admission Date: 10/12/2019  Length of Stay: 3 days  Attending Physician: Jean Carlos Pierre MD    Subjective:     Principal Problem:Embolic stroke involving right middle cerebral artery    Hospital Course:   10/13/19: Evaluated by PT & OT. Bed mobility SBA- modA. Sit to stand Arias- HHA. Ambulated 20 ft modA x 2- HHA. Grooming, UBD, & LBD modA.       Interval History 10/15/2019:  Patient is seen for follow-up rehab evaluation and recommendations: Participating with therapy. L sided weakness improving.     HPI, Past Medical, Family, and Social History remains the same as documented in the initial encounter.    Scheduled Medications:    aspirin  81 mg Oral Daily    atorvastatin  40 mg Oral Daily    clopidogrel  75 mg Oral Daily    heparin (porcine)  5,000 Units Subcutaneous Q8H    pantoprazole  40 mg Oral Daily       Diagnostic Results:   Labs: Reviewed    PRN Medications: acetaminophen, labetalol, ramelteon, sodium chloride 0.9%    Review of Systems   Constitutional: Negative for fatigue and fever.   HENT: Negative for sore throat and trouble swallowing.    Eyes: Negative for visual disturbance.   Respiratory: Negative for cough and shortness of breath.    Cardiovascular: Negative for chest pain and leg swelling.   Gastrointestinal: Negative for abdominal distention and abdominal pain.   Genitourinary: Negative for difficulty urinating.   Musculoskeletal: Positive for gait problem. Negative for back pain.   Skin: Negative for color change.   Neurological: Positive for weakness. Negative for dizziness, light-headedness and headaches.   Psychiatric/Behavioral: Negative for agitation and confusion.     Objective:     Vital Signs (Most Recent):  Temp: 97.4 °F (36.3 °C) (10/15/19 0727)  Pulse: 91 (10/15/19 0727)  Resp: 16 (10/15/19 0727)  BP: (!) 144/87 (10/15/19 0727)  SpO2: (!) 94 % (10/15/19 0727)     Vital Signs (24h Range):  Temp:  [96.3 °F (35.7 °C)-98.6 °F (37 °C)] 97.4 °F (36.3 °C)  Pulse:  [] 91  Resp:  [16-20] 16  SpO2:  [93 %-96 %] 94 %  BP: (136-144)/(70-90) 144/87     Physical Exam   Constitutional: She appears well-developed and well-nourished.   HENT:   Head: Normocephalic and atraumatic.   Eyes: Pupils are equal, round, and reactive to light. EOM are normal.   Neck: Neck supple.   Pulmonary/Chest: Effort normal. No respiratory distress.   Abdominal: Normal appearance.   Neurological:   - alert, following commands  - L sided weakness  - facial droop   Skin: Skin is warm and dry.   Psychiatric: She has a normal mood and affect.   Nursing note and vitals reviewed.    Assessment/Plan:      * Embolic stroke involving right middle cerebral artery  - CTA conformed no LVO.  -  MRI/MRA with R M1 occlusion, no interventions.    See hospital course for functional, cognitive/speech/language, and nutrition/swallow status.      Recommendations  -  Encourage mobility, OOB in chair at least 3 hours per day, and early ambulation as appropriate   -  PT/OT evaluate and treat  -  SLP speech and cognitive evaluate and treat  -  Monitor sleep disturbances and establish consistent sleep-wake cycle  -  Monitor for bowel and bladder dysfunction  -  Monitor for shoulder pain and subluxation  -  Monitor for spasticity  -  Monitor for and prevent skin breakdown and pressure ulcers  · Early mobility, repositioning/weight shifting every 20-30 minutes when sitting, turn patient every 2 hours, proper mattress/overlay and chair cushioning, pressure relief/heel protector boots  -  DVT prophylaxis  -  Reviewed discharge options (IP rehab, SNF, HH therapy, and OP therapy)    Internal carotid artery stenosis, bilateral  - VAS US- L ICA with 60-70% stenosis.   - No intervention recommended per Vasx sx. F/U in clinic with Vas sx      Essential hypertension  - stroke risk factor    Recommend inpatient rehab.     Arlene Sands,  NP  Department of Physical Medicine & Rehab   [facility]

## 2019-10-15 NOTE — ASSESSMENT & PLAN NOTE
58 yo woman with PMHx HTN, CAD, CHF, asthma who presented to [facility] with acute onset of L-sided weakness, slurred speech, and L facial droop. Telestroke completed an no tPA recommended as outside treatment window. Outside CTA then did not reveal an LVO so pt not a candidate for acute IR intervention, but had concerns for high-grade R ICA stenosis. Admitted for acute stroke workup.    MRI Brain demonstrating acute R MCA distribution infarct. Unclear source of lesion (outside CD) - MRA here showing what appears to be R ICA extracranial high-grade stenosis but also an abrupt lack of flow void in distal ICA/cavernous segment, suggesting possible thrombus, good flow from COW collaterals with additional R MCA M1 abrupt occlusion. VAS US Carotids concerning for R ICA occlusion with reconstituted flow as well as L ICA stenosis. Vascular Surgery saw the patient and is electing for medical management with ambulatory surveillance of her L ICA (see below.)  Overall, favor R ICA origin disease w/ artery to artery athero-embolism, though mild L atrial enlargement seen on echo as well so cannot definitively rule out ESUS process as well.     Patient remains medically and neurologically stable; plans for d/c to inpatient rehab in the morning.      Antithrombotics for secondary stroke prevention: Antiplatelets: Aspirin: 81 mg daily  Clopidogrel: 75 mg daily  Statins for secondary stroke prevention and hyperlipidemia, if present:   Statins: Atorvastatin- 40 mg daily  Aggressive risk factor modification: HTN, HLD, Diet, Exercise  Rehab efforts: The patient has been evaluated by a stroke team provider and the therapy needs have been fully considered based off the presenting complaints and exam findings. The following therapy evaluations are needed: PT evaluate and treat, OT evaluate and treat, SLP evaluate and treat, PM&R evaluate for appropriate placement - Dispo rehab  Diagnostics ordered/pending: None   VTE prophylaxis: Heparin 5000  units SQ every 8 hours and mechanical SCDs  BP parameters: carotid stenosis: No restriction if asymptomatic

## 2019-10-15 NOTE — ASSESSMENT & PLAN NOTE
Stroke risk factor  SBP goal 160-180; Range 117-144, improved over last 24 hrs  Continuing IVFs today but encouraged patient to focus on increasing PO hydration to bolster BP for good cerebral perfusion.   Will continue to monitor

## 2019-10-15 NOTE — SUBJECTIVE & OBJECTIVE
Interval History 10/15/2019:  Patient is seen for follow-up rehab evaluation and recommendations: Participating with therapy. L sided weakness improving.     HPI, Past Medical, Family, and Social History remains the same as documented in the initial encounter.    Scheduled Medications:    aspirin  81 mg Oral Daily    atorvastatin  40 mg Oral Daily    clopidogrel  75 mg Oral Daily    heparin (porcine)  5,000 Units Subcutaneous Q8H    pantoprazole  40 mg Oral Daily       Diagnostic Results:   Labs: Reviewed    PRN Medications: acetaminophen, labetalol, ramelteon, sodium chloride 0.9%    Review of Systems   Constitutional: Negative for fatigue and fever.   HENT: Negative for sore throat and trouble swallowing.    Eyes: Negative for visual disturbance.   Respiratory: Negative for cough and shortness of breath.    Cardiovascular: Negative for chest pain and leg swelling.   Gastrointestinal: Negative for abdominal distention and abdominal pain.   Genitourinary: Negative for difficulty urinating.   Musculoskeletal: Positive for gait problem. Negative for back pain.   Skin: Negative for color change.   Neurological: Positive for weakness. Negative for dizziness, light-headedness and headaches.   Psychiatric/Behavioral: Negative for agitation and confusion.     Objective:     Vital Signs (Most Recent):  Temp: 97.4 °F (36.3 °C) (10/15/19 0727)  Pulse: 91 (10/15/19 0727)  Resp: 16 (10/15/19 0727)  BP: (!) 144/87 (10/15/19 0727)  SpO2: (!) 94 % (10/15/19 0727)    Vital Signs (24h Range):  Temp:  [96.3 °F (35.7 °C)-98.6 °F (37 °C)] 97.4 °F (36.3 °C)  Pulse:  [] 91  Resp:  [16-20] 16  SpO2:  [93 %-96 %] 94 %  BP: (136-144)/(70-90) 144/87     Physical Exam   Constitutional: She appears well-developed and well-nourished.   HENT:   Head: Normocephalic and atraumatic.   Eyes: Pupils are equal, round, and reactive to light. EOM are normal.   Neck: Neck supple.   Pulmonary/Chest: Effort normal. No respiratory distress.    Abdominal: Normal appearance.   Neurological:   - alert, following commands  - L sided weakness  - facial droop   Skin: Skin is warm and dry.   Psychiatric: She has a normal mood and affect.   Nursing note and vitals reviewed.    NEUROLOGICAL EXAMINATION:     CRANIAL NERVES     CN III, IV, VI   Pupils are equal, round, and reactive to light.  Extraocular motions are normal.

## 2019-10-15 NOTE — SUBJECTIVE & OBJECTIVE
Neurologic Chief Complaint: Left-sided weakness, slurred speech, L facial droop    Subjective:     Interval History: Patient is seen for follow-up neurological assessment and treatment recommendations:   TERRY. Vascular Surgery with no plans for acute intervention; will follow the patient in clinic. Continuing IVFs today but encouraged patient to focus on increasing PO hydration. Ordered PRN Ramelteon per pt request. Plans for d/c to inpatient rehab in the morning.    HPI, Past Medical, Family, and Social History remains the same as documented in the initial encounter.     Review of Systems   Constitutional: Negative for chills and fever.   Gastrointestinal: Negative for nausea and vomiting.   Neurological: Positive for facial asymmetry, speech difficulty, weakness, numbness and headaches.   Psychiatric/Behavioral: Negative for agitation and confusion.     Scheduled Meds:   aspirin  81 mg Oral Daily    atorvastatin  40 mg Oral Daily    clopidogrel  75 mg Oral Daily    heparin (porcine)  5,000 Units Subcutaneous Q8H    pantoprazole  40 mg Oral Daily     Continuous Infusions:   sodium chloride 0.9%       PRN Meds:acetaminophen, labetalol, ramelteon, sodium chloride 0.9%    Objective:     Vital Signs (Most Recent):  Temp: 98 °F (36.7 °C) (10/15/19 1136)  Pulse: 96 (10/15/19 1136)  Resp: 18 (10/15/19 1136)  BP: 131/82 (10/15/19 1136)  SpO2: (!) 92 % (10/15/19 1136)  BP Location: Right arm    Vital Signs Range (Last 24H):  Temp:  [97.4 °F (36.3 °C)-98.6 °F (37 °C)]   Pulse:  []   Resp:  [16-20]   BP: (117-144)/(65-90)   SpO2:  [92 %-97 %]   BP Location: Right arm    Physical Exam   Constitutional: She is oriented to person, place, and time. She appears well-developed and well-nourished. No distress.   HENT:   Head: Normocephalic and atraumatic.   Eyes: Conjunctivae are normal. No scleral icterus.   Cardiovascular: Normal rate.   Pulmonary/Chest: Effort normal. No respiratory distress.   Musculoskeletal: She  exhibits no edema or deformity.   Neurological: She is alert and oriented to person, place, and time. A sensory deficit is present.   Skin: Skin is warm and dry.   Psychiatric: She has a normal mood and affect. Her behavior is normal. Cognition and memory are not impaired. She is attentive.   Vitals reviewed.      Neurological Exam:   LOC: alert  Attention Span: Good   Language: No aphasia  Articulation: Dysarthria  Orientation: Person, Place, Time   Visual Fields: Full  EOM (CN III, IV, VI): Gaze preference  right  Facial Movement (CN VII): Lower facial weakness on the Left  Motor: Arm left  Paresis: 3/5  Leg left  Paresis: 4/5  Arm right  Normal 5/5  Leg right Normal 5/5  Sensation: Carlos-hypoesthesia left  Tone: Normal tone throughout    Laboratory:  CMP:   No results for input(s): GLUCOSE, CALCIUM, ALBUMIN, PROT, NA, K, CO2, CL, BUN, CREATININE, ALKPHOS, ALT, AST, BILITOT in the last 24 hours.  BMP:   Recent Labs   Lab 10/14/19  0449      K 3.8      CO2 26   BUN 16   CREATININE 0.8   CALCIUM 9.2       Diagnostic Results     Brain imaging:     MRI Brain WO Contrast 10/13/19    Acute right MCA distribution infarction predominantly involving the right basal ganglia.  No evidence of hemorrhagic conversion.  Partially absent right ICA and MCA flow voids likely representing vascular occlusions corresponding to abnormal findings on MRA reported separately.      CT Head 10/12/19  Hypoattenuation in a right MCA distribution with loss of gray-white differentiation and hyperdense MCA sign concerning for acute stroke with right MCA thrombus.      Vessel Imaging:    VAS US Carotids MACI 10/14/19  Impression:   RIGHT SIDE:   The proximal right ICA appears to be occluded. Retrograde branches are noted reconstituting flow into the mid/distal ICA.   Calcification of the right internal carotid artery.   Heterogeneous plaque in the right common carotid artery, which appears to have blunted flow.   Antegrade flow in the  right vertebral artery.  LEFT SIDE:  60-79% Left ICA stenosis.  Calcification of the left internal carotid artery.  Heterogeneous plaque in the left common carotid artery.   Antegrade flow in the left vertebral artery.    MRA Brain/Neck 10/13/19  Marked narrowing of the visualized right internal carotid artery through the skull base with occlusion of the mid right M1 segment of the MCA.  The right common carotid and internal carotid artery are diminutive with minimal flow throughout. There may be segmental sections of trickle flow. The right internal carotid artery is quite diminutive at the entrance to the skull base and cavernous sinus. The appearance suggest diffuse disease potential with thrombosis.  No definite findings for dissection.  The left carotid and bilateral vertebral arteries appear normal.    CTA Head/Neck at outside facility   R ICA stenosis 99%  L ICA stenosis 70%       Cardiac Evaluation:     TTE 10/13/19  · Normal left ventricular systolic function. The estimated ejection fraction is 65%.  · Normal right ventricular systolic function.  · Indeterminate left ventricular diastolic function.  · Mild left atrial enlargement.  · No wall motion abnormalities.

## 2019-10-15 NOTE — PT/OT/SLP PROGRESS
"Speech Language Pathology Treatment    Patient Name:  Karen Ellison   MRN:  [id number]  Admitting Diagnosis: Embolic stroke involving right middle cerebral artery    Recommendations:                 General Recommendations:  Speech/language therapy, Cognitive-linguistic therapy and monitor diet tolerance   Diet recommendations:  Mechanical soft, Liquid Diet Level: Thin   Aspiration Precautions: 1 bite/sip at a time, Alternating bites/sips, Assistance with meals, Avoid talking while eating, Check for pocketing/oral residue - encourage finger sweep during and after meal to manage pocketing, Eliminate distractions, Frequent oral care, HOB to 90 degrees, Meds whole 1 at a time, Monitor for s/s of aspiration, Small bites/sips and Strict aspiration precautions   General Precautions: Standard, aspiration, fall  Communication strategies:  none    Subjective   "I think I have improved a lot."     Pain/Comfort:  · Pain Rating 1: 0/10  · Pain Rating Post-Intervention 1: 0/10    Objective:     Has the patient been evaluated by SLP for swallowing?   Yes  Keep patient NPO? No   Current Respiratory Status: room air      Pt awake/alert upon arrival with sister present at bedside. Pt reported improved tolerance of meals with less pocketing and oral residue, incorporating lingual and finger sweeps. Pt and sister expressed they see a lot of improvement in swallow function and cognition/speech. Pt completed clock drawing task with good sustained attention to task, good planning and processing of number placement, and demonstrated no visual-spatial deficits. She signed her name on a line and wrote a sentence legibly and demonstrated no written deficits. Pt completed simple verbal money and time questions with 66% accuracy independently/100% accuracy given min cues. She stated she has never been good with math. Pt completed OMEs with good effort and fair ability to execute postures given SLP model and feedback. SLP encouraged pt to " practice OME's independently 2-3 times each day to improve oral motor strength, function, and range of motion.   Education provided included rationale for all tasks, changes that occur to the brain following a stroke, importance for OME's, and SLP treatment plan/POC. Pt/sister expressed understanding.    Assessment:     Karen Ellison is a 59 y.o. female with an SLP diagnosis of improving Dysphagia and dysarthria.     Goals:   Multidisciplinary Problems     SLP Goals        Problem: SLP Goal    Goal Priority Disciplines Outcome   SLP Goal     SLP Ongoing, Progressing   Description:  Speech Language Pathology Goals  Goals expected to be met by 10/21:  1. Pt will tolerate mechanical soft diet and thin liquids while utilizing strategies to manage pocketing and reduce risks of aspiration.  2. Pt will perform OME's x 5-10 to increase oral motor strength, ROM, and function.   3. Pt will participate in speech/language/cognitive evaluation to determine appropriate goals. Goal met 10/14:  ADDITIONAL GOALS:  3. Pt will complete delayed memory tasks with 90% accuracy given min cues.  4. Pt will utilize motor speech strategies during conversational speech to improve quality of speech/articulation.   5. Pt will participate in ongoing assessment of writing, math/time/money management, and visual spatial/left attention to determine need for further intervention.-met                        Plan:     · Patient to be seen:  4 x/week   · Plan of Care expires:  11/12/19  · Plan of Care reviewed with:  patient, sibling   · SLP Follow-Up:  Yes       Discharge recommendations:  rehabilitation facility   Barriers to Discharge:  None    Time Tracking:     SLP Treatment Date:   10/15/19  Speech Start Time:  1437  Speech Stop Time:  1458     Speech Total Time (min):  21 min    Billable Minutes: Speech Therapy Individual 13 and [facility] Care/Home Management Training 8    FELIX Prince  10/15/2019

## 2019-10-16 VITALS
OXYGEN SATURATION: 96 % | BODY MASS INDEX: 25.66 KG/M2 | SYSTOLIC BLOOD PRESSURE: 121 MMHG | DIASTOLIC BLOOD PRESSURE: 79 MMHG | HEART RATE: 82 BPM | RESPIRATION RATE: 16 BRPM | HEIGHT: 65 IN | WEIGHT: 154 LBS | TEMPERATURE: 98 F

## 2019-10-16 PROBLEM — D72.829 LEUKOCYTOSIS: Status: ACTIVE | Noted: 2019-10-16

## 2019-10-16 PROBLEM — E11.9 TYPE 2 DIABETES MELLITUS, WITHOUT LONG-TERM CURRENT USE OF INSULIN: Status: ACTIVE | Noted: 2019-10-16

## 2019-10-16 LAB
ANION GAP SERPL CALC-SCNC: 13 MMOL/L (ref 8–16)
BASOPHILS # BLD AUTO: 0.1 K/UL (ref 0–0.2)
BASOPHILS NFR BLD: 0.7 % (ref 0–1.9)
BUN SERPL-MCNC: 10 MG/DL (ref 6–20)
CALCIUM SERPL-MCNC: 9 MG/DL (ref 8.7–10.5)
CHLORIDE SERPL-SCNC: 107 MMOL/L (ref 95–110)
CO2 SERPL-SCNC: 22 MMOL/L (ref 23–29)
CREAT SERPL-MCNC: 0.8 MG/DL (ref 0.5–1.4)
DIFFERENTIAL METHOD: ABNORMAL
EOSINOPHIL # BLD AUTO: 0.4 K/UL (ref 0–0.5)
EOSINOPHIL NFR BLD: 3 % (ref 0–8)
ERYTHROCYTE [DISTWIDTH] IN BLOOD BY AUTOMATED COUNT: 15.4 % (ref 11.5–14.5)
EST. GFR  (AFRICAN AMERICAN): >60 ML/MIN/1.73 M^2
EST. GFR  (NON AFRICAN AMERICAN): >60 ML/MIN/1.73 M^2
GLUCOSE SERPL-MCNC: 171 MG/DL (ref 70–110)
HCT VFR BLD AUTO: 36.9 % (ref 37–48.5)
HGB BLD-MCNC: 11.9 G/DL (ref 12–16)
IMM GRANULOCYTES # BLD AUTO: 0.07 K/UL (ref 0–0.04)
IMM GRANULOCYTES NFR BLD AUTO: 0.5 % (ref 0–0.5)
LYMPHOCYTES # BLD AUTO: 2.5 K/UL (ref 1–4.8)
LYMPHOCYTES NFR BLD: 17.4 % (ref 18–48)
MAGNESIUM SERPL-MCNC: 1.5 MG/DL (ref 1.6–2.6)
MCH RBC QN AUTO: 29.1 PG (ref 27–31)
MCHC RBC AUTO-ENTMCNC: 32.2 G/DL (ref 32–36)
MCV RBC AUTO: 90 FL (ref 82–98)
MONOCYTES # BLD AUTO: 0.8 K/UL (ref 0.3–1)
MONOCYTES NFR BLD: 5.4 % (ref 4–15)
NEUTROPHILS # BLD AUTO: 10.3 K/UL (ref 1.8–7.7)
NEUTROPHILS NFR BLD: 73 % (ref 38–73)
NRBC BLD-RTO: 0 /100 WBC
PHOSPHATE SERPL-MCNC: 3.4 MG/DL (ref 2.7–4.5)
PLATELET # BLD AUTO: 217 K/UL (ref 150–350)
PMV BLD AUTO: 11.5 FL (ref 9.2–12.9)
POTASSIUM SERPL-SCNC: 3.8 MMOL/L (ref 3.5–5.1)
RBC # BLD AUTO: 4.09 M/UL (ref 4–5.4)
SODIUM SERPL-SCNC: 142 MMOL/L (ref 136–145)
WBC # BLD AUTO: 14.14 K/UL (ref 3.9–12.7)

## 2019-10-16 PROCEDURE — 97535 SELF CARE MNGMENT TRAINING: CPT

## 2019-10-16 PROCEDURE — 85025 COMPLETE CBC W/AUTO DIFF WBC: CPT

## 2019-10-16 PROCEDURE — 80048 BASIC METABOLIC PNL TOTAL CA: CPT

## 2019-10-16 PROCEDURE — 63600175 PHARM REV CODE 636 W HCPCS: Performed by: PHYSICIAN ASSISTANT

## 2019-10-16 PROCEDURE — 99233 PR SUBSEQUENT HOSPITAL CARE,LEVL III: ICD-10-PCS | Mod: ,,, | Performed by: PSYCHIATRY & NEUROLOGY

## 2019-10-16 PROCEDURE — 25000003 PHARM REV CODE 250: Performed by: NURSE PRACTITIONER

## 2019-10-16 PROCEDURE — 84100 ASSAY OF PHOSPHORUS: CPT

## 2019-10-16 PROCEDURE — 83735 ASSAY OF MAGNESIUM: CPT

## 2019-10-16 PROCEDURE — 99233 SBSQ HOSP IP/OBS HIGH 50: CPT | Mod: ,,, | Performed by: PSYCHIATRY & NEUROLOGY

## 2019-10-16 PROCEDURE — 63600175 PHARM REV CODE 636 W HCPCS: Performed by: NURSE PRACTITIONER

## 2019-10-16 PROCEDURE — 97112 NEUROMUSCULAR REEDUCATION: CPT

## 2019-10-16 PROCEDURE — 36415 COLL VENOUS BLD VENIPUNCTURE: CPT

## 2019-10-16 RX ORDER — ATORVASTATIN CALCIUM 40 MG/1
40 TABLET, FILM COATED ORAL DAILY
Qty: 90 TABLET | Refills: 3
Start: 2019-10-16 | End: 2020-10-15

## 2019-10-16 RX ORDER — CLOPIDOGREL BISULFATE 75 MG/1
75 TABLET ORAL DAILY
Qty: 30 TABLET | Refills: 11
Start: 2019-10-16 | End: 2020-10-15

## 2019-10-16 RX ORDER — ASPIRIN 81 MG/1
81 TABLET ORAL DAILY
Refills: 0
Start: 2019-10-16 | End: 2020-10-15

## 2019-10-16 RX ORDER — PANTOPRAZOLE SODIUM 40 MG/1
40 TABLET, DELAYED RELEASE ORAL DAILY
Qty: 30 TABLET | Refills: 11
Start: 2019-10-16 | End: 2020-10-15

## 2019-10-16 RX ORDER — MAGNESIUM SULFATE HEPTAHYDRATE 40 MG/ML
2 INJECTION, SOLUTION INTRAVENOUS ONCE
Status: COMPLETED | OUTPATIENT
Start: 2019-10-16 | End: 2019-10-16

## 2019-10-16 RX ORDER — RAMELTEON 8 MG/1
8 TABLET ORAL NIGHTLY PRN
Start: 2019-10-16 | End: 2019-11-05

## 2019-10-16 RX ORDER — ACETAMINOPHEN 325 MG/1
650 TABLET ORAL EVERY 6 HOURS PRN
Refills: 0
Start: 2019-10-16 | End: 2019-11-05

## 2019-10-16 RX ADMIN — MAGNESIUM SULFATE IN WATER 2 G: 40 INJECTION, SOLUTION INTRAVENOUS at 08:10

## 2019-10-16 RX ADMIN — ASPIRIN 81 MG: 81 TABLET, COATED ORAL at 08:10

## 2019-10-16 RX ADMIN — PANTOPRAZOLE SODIUM 40 MG: 40 TABLET, DELAYED RELEASE ORAL at 08:10

## 2019-10-16 RX ADMIN — HEPARIN SODIUM 5000 UNITS: 5000 INJECTION, SOLUTION INTRAVENOUS; SUBCUTANEOUS at 05:10

## 2019-10-16 RX ADMIN — ATORVASTATIN CALCIUM 40 MG: 20 TABLET, FILM COATED ORAL at 08:10

## 2019-10-16 RX ADMIN — CLOPIDOGREL BISULFATE 75 MG: 75 TABLET ORAL at 08:10

## 2019-10-16 NOTE — NURSING
Patient discharged to [facility] via EMS. Family took home personal belongings. VSS, A/Ox4. Denies any pain or discomfort. Discharge papers reviewed with patient. Patient verbalized understanding.

## 2019-10-16 NOTE — ASSESSMENT & PLAN NOTE
60 yo woman with PMHx HTN, CAD, CHF, asthma who presented to [facility] with acute onset of L-sided weakness, slurred speech, and L facial droop. Telestroke completed an no tPA recommended as outside treatment window. Outside CTA then did not reveal an LVO so pt not a candidate for acute IR intervention, but had concerns for high-grade R ICA stenosis. Admitted for acute stroke workup.    MRI Brain demonstrating acute R MCA distribution infarct. Unclear source of lesion (outside CD) - MRA here showing what appears to be R ICA extracranial high-grade stenosis but also an abrupt lack of flow void in distal ICA/cavernous segment, suggesting possible thrombus, good flow from COW collaterals with additional R MCA M1 abrupt occlusion. VAS US Carotids concerning for R ICA occlusion with reconstituted flow as well as L ICA stenosis. Vascular Surgery saw the patient and is electing for medical management with ambulatory surveillance of her L ICA (see below.)  Overall, favor R ICA origin disease w/ artery to artery athero-embolism, though mild L atrial enlargement seen on echo so cardioembolic source could be considered in the differential as well.      Antithrombotics for secondary stroke prevention: Antiplatelets: Aspirin: 81 mg daily  Clopidogrel: 75 mg daily  Statins for secondary stroke prevention and hyperlipidemia, if present:   Statins: Atorvastatin- 40 mg daily  Aggressive risk factor modification: HTN, HLD, Diet, Exercise  Rehab efforts: [facility] inpatient rehab  BP parameters: carotid stenosis: No restriction if asymptomatic

## 2019-10-16 NOTE — PLAN OF CARE
[facility]    FACILITY TRANSFER ORDERS      Patient Name: Karen Ellison  YOB: 1960    PCP: Primary Doctor No   PCP Address: None  PCP Phone Number: None  PCP Fax: None    Encounter Date: 10/16/2019    Admit to: [facility] inpatient rehab    Vital Signs:  Routine    Diagnoses:   Active Hospital Problems    Diagnosis  POA    *Embolic stroke involving right middle cerebral artery [I63.411]  Yes     Priority: 1 - High    Internal carotid artery stenosis, bilateral [I65.23]  Yes     Priority: 2     Essential hypertension [I10]  Yes     Priority: 3     Acute pain of left shoulder [M25.512]  No     Priority: 4     Mixed hyperlipidemia [E78.2]  Yes     Priority: 4     Cytotoxic cerebral edema [G93.6]  Yes     Priority: 4     Bilateral carotid artery stenosis [I65.23]  Unknown    Stroke [I63.9]  Yes      Resolved Hospital Problems   No resolved problems to display.       Allergies:Review of patient's allergies indicates:  No Known Allergies    Diet: Diabetic soft diet and fluid consistency thin    Activities: Activity as tolerated    Nursing: Per facility protocol     Labs: Per facility protocol    CONSULTS:    Physical Therapy to evaluate and treat. , Occupational Therapy to evaluate and treat., Speech Therapy to evaluate and treat for Language, Swallowing and Cognition. and  to evaluate for community resources/long-range planning.    MISCELLANEOUS CARE:  Diabetes Care:   SN to perform and educate Diabetic management with blood glucose monitoring:, Fingerstick blood sugar AC and HS and Report CBG < 60 or > 350 to physician.    WOUND CARE ORDERS  None    Medications: Review discharge medications with patient and family and provide education.      Current Discharge Medication List      START taking these medications    Details   acetaminophen (TYLENOL) 325 MG tablet Take 2 tablets (650 mg total) by mouth every 6 (six) hours as needed.  Refills: 0      aspirin (ECOTRIN) 81  MG EC tablet Take 1 tablet (81 mg total) by mouth once daily.  Refills: 0      atorvastatin (LIPITOR) 40 MG tablet Take 1 tablet (40 mg total) by mouth once daily.  Qty: 90 tablet, Refills: 3      clopidogrel (PLAVIX) 75 mg tablet Take 1 tablet (75 mg total) by mouth once daily.  Qty: 30 tablet, Refills: 11      pantoprazole (PROTONIX) 40 MG tablet Take 1 tablet (40 mg total) by mouth once daily.  Qty: 30 tablet, Refills: 11      ramelteon (ROZEREM) 8 mg tablet Take 1 tablet (8 mg total) by mouth nightly as needed for Insomnia.              Follow-up With    Details  Why  Contact Info   PCP  In 1 week  Need to establish care with a primary care provider for hospital follow-up within 1 week of discharge and close management of stroke risk factors.       ALVARO Whelan III, MD  In 6 weeks  With carotid US; Someone will call you to set up this appt.  [address]  [facility] [address]  [phone]     [facility] VASCULAR NEUROLOGY  In 6 weeks  Someone from our office will call you to set up hospital follow-up in 4-8 weeks. If nobody calls within 1 week, call number above to schedule an appt.  [address]  [facility] [address]  [phone]               _________________________________  Arcelia Bazzi PA-C  10/16/2019

## 2019-10-16 NOTE — ASSESSMENT & PLAN NOTE
Stroke risk factor  CTA at outside facility reported R ICA high grade stenosis. As seen on MRA here as well.  VAS US Carotids 10/14 then with evidence of R ICA occlusion and L ICA stenosis.   Consulted Vascular Surgery; Appreciate recs. Plans for medical management, no acute intervention for now.  Ordered Ambulatory referral to Vascular Surgery at discharge for monitoring of her L ICA stenosis. Appt in 6 weeks for Carotid US.  Pt is with improving PO hydration to bolster BP for good cerebral perfusion in the setting of carotid stenosis.  DAPT + statin

## 2019-10-16 NOTE — ASSESSMENT & PLAN NOTE
Stroke risk factor  SBP goal 160-180; BP initially borderline low but now improving  Encouraged patient to focus on increasing PO hydration to bolster BP for good cerebral perfusion. She conveyed understanding.  PCP follow-up

## 2019-10-16 NOTE — PLAN OF CARE
10/16/19 1030   Post-Acute Status   Post-Acute Authorization Placement   Post-Acute Placement Status Set-up Complete       Pt has been accepted by [facility].  Nurse can call report to [phone].  Transport setup by EMS for 12.  SW in contact with CM and Medical staff.       Shaun Rios LMSW  [facility]   Ext. [phone]

## 2019-10-16 NOTE — PT/OT/SLP PROGRESS
"Occupational Therapy   Treatment    Name: Karen Ellison  MRN: [id number]  Admitting Diagnosis:  Embolic stroke involving right middle cerebral artery       Recommendations:     Discharge Recommendations: rehabilitation facility  Discharge Equipment Recommendations:  shower chair  Barriers to discharge:  None    Assessment:     Karen Ellison is a 59 y.o. female with a medical diagnosis of Embolic stroke involving right middle cerebral artery.  She presents with performance deficits affecting function are weakness, impaired endurance, impaired sensation, impaired self care skills, impaired functional mobilty, gait instability, impaired balance, impaired cognition, decreased coordination, decreased upper extremity function, decreased lower extremity function, decreased safety awareness, abnormal tone, decreased ROM, impaired coordination, impaired fine motor.     Rehab Prognosis:  Good; patient would benefit from acute skilled OT services to address these deficits and reach maximum level of function.       Plan:     Patient to be seen 5 x/week to address the above listed problems via self-care/home management, therapeutic activities, therapeutic exercises, neuromuscular re-education, cognitive retraining, sensory integration  · Plan of Care Expires: 11/10/19  · Plan of Care Reviewed with: patient, sibling    Subjective   Patient:  "I hope to go to rehab soon."  Sister:  "She walked down the mckeon yesterday."  Pain/Comfort:  · Pain Rating 1: 0/10  · Pain Rating Post-Intervention 1: 0/10    Objective:     Communicated with: Nurse prior to session.  Patient found supine with peripheral IV, telemetry, bed alarm upon OT entry to room.    General Precautions: Standard, aspiration, fall   Orthopedic Precautions:N/A   Braces: N/A     Occupational Performance:     Bed Mobility:    · Patient completed Rolling/Turning to Left with  stand by assistance  · Patient completed Rolling/Turning to Right with contact guard " assistance  · Patient completed Scooting/Bridging with contact guard assistance  · Patient completed Supine to Sit with contact guard assistance  · Patient completed Sit to Supine with stand by assistance     Functional Mobility/Transfers:  · Patient completed Sit <> Stand Transfer with minimum assistance  with  no assistive device   · Patient completed Bed <> Chair Transfer using Stand Pivot technique with minimum assistance with no assistive device    Activities of Daily Living:  · Grooming: minimum assistance while standing  · Upper Body Dressing: moderate assistance while standing to gather clothing, assistance to guide around back and for fasteners   · Lower Body Dressing: moderate assistance while seated EOB    [facility] 6 Click ADL: 13    Treatment & Education:  Patient/ Family education provided for stroke warning signs, prevention guidelines and personal risk factors.  Patient/ Family verbalizing understanding via teach back method.  Patient education provided on role of OT and need for rehab upon discharge.  Patient education provided on hemiplegic dressing technique, left UE weight bearing / positioning, postural control, and transfers.  Continued education, patient/ family training recommended.  Patient alert and oriented x 3; able to follow 4/4 one step commands.  Patient attentive and interactive throughout the session.  Addressed left UE weight bearing while seated EOB.  Addressed oral motor ex while seated.  Patient's functional status and disposition recommendation discussed with stroke team in daily rounds.  White board updated in patient's room.  OT asked if there were any other questions; patient/ family had no further questions.    Patient left supine with all lines intact, call button in reach and bed alarm onEducation:      GOALS:   Multidisciplinary Problems     Occupational Therapy Goals        Problem: Occupational Therapy Goal    Goal Priority Disciplines Outcome Interventions   Occupational  Therapy Goal     OT, PT/OT Ongoing, Progressing    Description:  Goals set 10/13 to be addressed for 14 days with expiration date, 10/27:  Patient will increase functional independence with ADLs by performing:    Patient will demonstrate rolling to the right with SBA assist.  Not met   Patient will demonstrate rolling to the left with modified independence.   Not met  Patient will demonstrate supine -sit with SBA.   Not met  Patient will demonstrate stand pivot transfers with CGA.   Not met  Patient will demonstrate grooming while standing with CGA.   Not met  Patient will demonstrate upper body dressing with min assist while seated EOB.   Not met  Patient will demonstrate lower body dressing with min assist while seated EOB.   Not met  Patient will demonstrate toileting with min assist.   Not met  Patient will demonstrate bathing while seated EOB with min assist.   Not met  Patient's family / caregiver will demonstrate independence and safety with assisting patient with self-care skills and functional mobility.     Not met  Patient's family / caregiver will demonstrate independence with providing ROM and changes in bed positioning.   Not met  Patient and/or patient's family will verbalize understanding of stroke prevention guidelines, personal risk factors and stroke warning signs via teachback method.  Not met                            Time Tracking:     OT Date of Treatment: 10/16/19  OT Start Time: 0541  OT Stop Time: 0619  OT Total Time (min): 38 min    Billable Minutes:Self Care/Home Management 25  Neuromuscular Re-education 13    MAMTA Sandoval  10/16/2019

## 2019-10-16 NOTE — ASSESSMENT & PLAN NOTE
WBC 14 > 10 > 14  Pt is afebrile, appears non-toxic, and denies any complaints currently.  Possible chronic process vs inflammatory response in the setting of acute stroke.  No indications for further evaluation or treatment at this time.  Can be further monitored at inpatient rehab

## 2019-10-16 NOTE — SUBJECTIVE & OBJECTIVE
Neurologic Chief Complaint: Left-sided weakness, slurred speech, L facial droop    Subjective:     Interval History: Patient is seen for follow-up neurological assessment and treatment recommendations:   NAEON. Mg 1.5; ordered IV replacement. Pt remains medically and neurologically stable. Plan for d/c to inpatient rehab today.    HPI, Past Medical, Family, and Social History remains the same as documented in the initial encounter.     Review of Systems   Constitutional: Negative for chills and fever.   Gastrointestinal: Negative for nausea and vomiting.   Neurological: Positive for facial asymmetry, speech difficulty, weakness and numbness.   Psychiatric/Behavioral: Negative for agitation and confusion.     Scheduled Meds:   aspirin  81 mg Oral Daily    atorvastatin  40 mg Oral Daily    clopidogrel  75 mg Oral Daily    heparin (porcine)  5,000 Units Subcutaneous Q8H    magnesium sulfate IVPB  2 g Intravenous Once    pantoprazole  40 mg Oral Daily     Continuous Infusions:    PRN Meds:acetaminophen, labetalol, ramelteon, sodium chloride 0.9%    Objective:     Vital Signs (Most Recent):  Temp: 98.6 °F (37 °C) (10/16/19 0738)  Pulse: 90 (10/16/19 0738)  Resp: 18 (10/16/19 0738)  BP: (!) 138/91 (10/16/19 0738)  SpO2: (!) 94 % (10/16/19 0738)  BP Location: Left arm    Vital Signs Range (Last 24H):  Temp:  [97.2 °F (36.2 °C)-98.6 °F (37 °C)]   Pulse:  [83-99]   Resp:  [16-18]   BP: (117-145)/(65-91)   SpO2:  [92 %-97 %]   BP Location: Left arm    Physical Exam   Constitutional: She is oriented to person, place, and time. She appears well-developed and well-nourished. No distress.   HENT:   Head: Normocephalic and atraumatic.   Eyes: Conjunctivae are normal. No scleral icterus.   Cardiovascular: Normal rate.   Pulmonary/Chest: Effort normal. No respiratory distress.   Musculoskeletal: She exhibits no edema or deformity.   Neurological: She is alert and oriented to person, place, and time. A sensory deficit is  present.   Skin: Skin is warm and dry.   Psychiatric: She has a normal mood and affect. Her behavior is normal. Cognition and memory are not impaired. She is attentive.   Vitals reviewed.      Neurological Exam:   LOC: alert  Attention Span: Good   Language: No aphasia  Articulation: Dysarthria  Orientation: Person, Place, Time   Visual Fields: Full  EOM (CN III, IV, VI): Gaze preference  right  Facial Movement (CN VII): Lower facial weakness on the Left  Motor: Arm left  Paresis: 3/5  Leg left  Paresis: 4/5  Arm right  Normal 5/5  Leg right Normal 5/5  Sensation: Carlos-hypoesthesia left  Tone: Normal tone throughout    Laboratory:  CMP:   Recent Labs   Lab 10/16/19  0516   CALCIUM 9.0      K 3.8   CO2 22*      BUN 10   CREATININE 0.8     BMP:   Recent Labs   Lab 10/16/19  0516      K 3.8      CO2 22*   BUN 10   CREATININE 0.8   CALCIUM 9.0       Diagnostic Results     Brain imaging:     MRI Brain WO Contrast 10/13/19    Acute right MCA distribution infarction predominantly involving the right basal ganglia.  No evidence of hemorrhagic conversion.  Partially absent right ICA and MCA flow voids likely representing vascular occlusions corresponding to abnormal findings on MRA reported separately.      CT Head 10/12/19  Hypoattenuation in a right MCA distribution with loss of gray-white differentiation and hyperdense MCA sign concerning for acute stroke with right MCA thrombus.      Vessel Imaging:    VAS US Carotids MACI 10/14/19  Impression:   RIGHT SIDE:   The proximal right ICA appears to be occluded. Retrograde branches are noted reconstituting flow into the mid/distal ICA.   Calcification of the right internal carotid artery.   Heterogeneous plaque in the right common carotid artery, which appears to have blunted flow.   Antegrade flow in the right vertebral artery.  LEFT SIDE:  60-79% Left ICA stenosis.  Calcification of the left internal carotid artery.  Heterogeneous plaque in the left  common carotid artery.   Antegrade flow in the left vertebral artery.    MRA Brain/Neck 10/13/19  Marked narrowing of the visualized right internal carotid artery through the skull base with occlusion of the mid right M1 segment of the MCA.  The right common carotid and internal carotid artery are diminutive with minimal flow throughout. There may be segmental sections of trickle flow. The right internal carotid artery is quite diminutive at the entrance to the skull base and cavernous sinus. The appearance suggest diffuse disease potential with thrombosis.  No definite findings for dissection.  The left carotid and bilateral vertebral arteries appear normal.    CTA Head/Neck at outside facility   R ICA stenosis 99%  L ICA stenosis 70%       Cardiac Evaluation:     TTE 10/13/19  · Normal left ventricular systolic function. The estimated ejection fraction is 65%.  · Normal right ventricular systolic function.  · Indeterminate left ventricular diastolic function.  · Mild left atrial enlargement.  · No wall motion abnormalities.

## 2019-10-16 NOTE — PLAN OF CARE
10/16/19 1040   Final Note   Assessment Type Final Discharge Note   Anticipated Discharge Disposition Rehab   What phone number can be called within the next 1-3 days to see how you are doing after discharge? [phone]   Hospital Follow Up  Appt(s) scheduled? Yes   Discharge plans and expectations educations in teach back method with documentation complete? Yes   Right Care Referral Info   Post Acute Recommendation No Care   Referral Type IRF   Facility Name [address] [place] [address]

## 2019-10-16 NOTE — ASSESSMENT & PLAN NOTE
Stroke risk factor  A1c 8.3% -- New diagnosis this admission  Recommend tight glucose control  BG was within goal range of 140-180 during admission; There was no need for SSI throughout admission.  Diabetic diet - Educated pt on need for diet, lifestyle modifications.  Will defer to PCP for further work-up and treatment.

## 2019-10-16 NOTE — PT/OT/SLP DISCHARGE
Occupational Therapy Discharge Summary    Karen Ellison  MRN: [id number]   Principal Problem: Embolic stroke involving right middle cerebral artery      Patient Discharged from acute Occupational Therapy on 10/16.  Please refer to prior OT note for functional status.    Assessment:      Patient appropriate for care in another setting.    Objective:     GOALS:   Multidisciplinary Problems     Occupational Therapy Goals        Problem: Occupational Therapy Goal    Goal Priority Disciplines Outcome Interventions   Occupational Therapy Goal     OT, PT/OT Ongoing, Progressing    Description:  Goals set 10/13 to be addressed for 14 days with expiration date, 10/27:  Patient will increase functional independence with ADLs by performing:    Patient will demonstrate rolling to the right with SBA assist.  Not met   Patient will demonstrate rolling to the left with modified independence.   Not met  Patient will demonstrate supine -sit with SBA.   Not met  Patient will demonstrate stand pivot transfers with CGA.   Not met  Patient will demonstrate grooming while standing with CGA.   Not met  Patient will demonstrate upper body dressing with min assist while seated EOB.   Not met  Patient will demonstrate lower body dressing with min assist while seated EOB.   Not met  Patient will demonstrate toileting with min assist.   Not met  Patient will demonstrate bathing while seated EOB with min assist.   Not met  Patient's family / caregiver will demonstrate independence and safety with assisting patient with self-care skills and functional mobility.     Not met  Patient's family / caregiver will demonstrate independence with providing ROM and changes in bed positioning.   Not met  Patient and/or patient's family will verbalize understanding of stroke prevention guidelines, personal risk factors and stroke warning signs via teachback method.  Not met                            Reasons for Discontinuation of Therapy Services  Transfer  to alternate level of care.      Plan:     Patient Discharged to: Inpatient Rehab    MAMTA Sandoval  10/16/2019

## 2019-10-17 NOTE — DISCHARGE SUMMARY
[facility]  Vascular Neurology  Comprehensive Stroke Center  Discharge Summary     Summary:     Admit Date: 10/12/2019  7:28 PM    Discharge Date and Time: 10/16/2019  1:03 PM    Attending Physician: Jean Carlos Pierre MD    Discharge Provider: Arcelia Bazzi PA-C    History of Present Illness: 59 year old female with past medical hx of HTN, CAD, CHF, and asthma presents as transfer from [facility] ED with acute left sided weakness, L facial droop, and slurred speech. LNK @ 0730 AM. Symptoms noticed by family at approximately 11:00 AM. Telestroke completed. Recommended CTA head and neck. Imaging was done at outside facility with no noted LVO but patient with significant high grade stenosis of the LICA measuring 99%. Patient transferred to [facility] for higher level of neurological care. All history was obtained per chart review. No family with patient on arrival to ED.     Hospital Course (synopsis of major diagnoses, care, treatment, and services provided during the course of the hospital stay): Ms. Karen Ellison was admitted to Vascular Neurology for acute stroke workup. Stroke etiology suspected to be large artery atheroembolic at this time. Pt's R ICA was found to be occluded so no acute intervention was pursued, plans for medical management; Vascular Surgery will follow her stenotic L ICA as an outpatient.    Patient discharged with recommendations for admission to [facility] inpatient rehab. Family at bedside amenable to plan.     Patient with improvement in stroke symptoms since admission. Inpatient acute stroke work up completed and patient stable for discharge. Please see all appropriate medication changes, imaging results, and necessary follow-up below.    Stroke Etiology: Evident Extracranial Supra-Aortic Large Artery Atherosclerosis (PAOLA)    STROKE DOCUMENTATION   Acute Stroke Times   Last Known Normal Date: 10/12/19  Last Known Normal Time: 0730  Symptom Onset Date: 10/12/19  Symptom  Onset Time: 0730  Stroke Team Called Date: 10/12/19  Stroke Team Called Time: 1930  Stroke Team Arrival Date: 10/12/19  Stroke Team Arrival Time: 1935  CT Interpretation Time: (NA)  Decision to Treat Time for Alteplase: (NA)  Decision to Treat Time for IR: (NA)     NIH Scale:  1a. Level of Consciousness: 0-->Alert, keenly responsive  1b. LOC Questions: 0-->Answers both questions correctly  1c. LOC Commands: 0-->Performs both tasks correctly  2. Best Gaze: 1-->Partial gaze palsy, gaze is abnormal in one or both eyes, but forced deviation or total gaze paresis is not present  3. Visual: 0-->No visual loss  4. Facial Palsy: 2-->Partial paralysis (total or near-total paralysis of lower face)  5a. Motor Arm, Left: 1-->Drift, limb holds 90 (or 45) degrees, but drifts down before full 10 seconds, does not hit bed or other support  5b. Motor Arm, Right: 0-->No drift, limb holds 90 (or 45) degrees for full 10 secs  6a. Motor Leg, Left: 1-->Drift, leg falls by the end of the 5-sec period but does not hit bed  6b. Motor Leg, Right: 0-->No drift, leg holds 30 degree position for full 5 secs  7. Limb Ataxia: 0-->Absent  8. Sensory: 1-->Mild-to-moderate sensory loss, patient feels pinprick is less sharp or is dull on the affected side, or there is a loss of superficial pain with pinprick, but patient is aware of being touched  9. Best Language: 0-->No aphasia, normal  10. Dysarthria: 1-->Mild-to-moderate dysarthria, patient slurs at least some words and, at worst, can be understood with some difficulty  11. Extinction and Inattention (formerly Neglect): 0-->No abnormality  Total (NIH Stroke Scale): 7        Modified Union Score: 3  Evaristo Coma Scale:    ABCD2 Score:    [id number] Score:   HAS -BLED Score:   ICH Score:   Hunt & Prakash Classification:       Assessment/Plan:     Diagnostic Results:    Brain imaging:      MRI Brain WO Contrast 10/13/19    Acute right MCA distribution infarction predominantly involving the right  basal ganglia.  No evidence of hemorrhagic conversion.  Partially absent right ICA and MCA flow voids likely representing vascular occlusions corresponding to abnormal findings on MRA reported separately.      CT Head 10/12/19  Hypoattenuation in a right MCA distribution with loss of gray-white differentiation and hyperdense MCA sign concerning for acute stroke with right MCA thrombus.        Vessel Imaging:     VAS US Carotids MACI 10/14/19  Impression:   RIGHT SIDE:   The proximal right ICA appears to be occluded. Retrograde branches are noted reconstituting flow into the mid/distal ICA.   Calcification of the right internal carotid artery.   Heterogeneous plaque in the right common carotid artery, which appears to have blunted flow.   Antegrade flow in the right vertebral artery.  LEFT SIDE:  60-79% Left ICA stenosis.  Calcification of the left internal carotid artery.  Heterogeneous plaque in the left common carotid artery.   Antegrade flow in the left vertebral artery.     MRA Brain/Neck 10/13/19  Marked narrowing of the visualized right internal carotid artery through the skull base with occlusion of the mid right M1 segment of the MCA.  The right common carotid and internal carotid artery are diminutive with minimal flow throughout. There may be segmental sections of trickle flow. The right internal carotid artery is quite diminutive at the entrance to the skull base and cavernous sinus. The appearance suggest diffuse disease potential with thrombosis.  No definite findings for dissection.  The left carotid and bilateral vertebral arteries appear normal.     CTA Head/Neck at outside facility   R ICA stenosis 99%  L ICA stenosis 70%        Cardiac Evaluation:      TTE 10/13/19  · Normal left ventricular systolic function. The estimated ejection fraction is 65%.  · Normal right ventricular systolic function.  · Indeterminate left ventricular diastolic function.  · Mild left atrial enlargement.  · No wall motion  abnormalities.      Interventions: None    Complications: None    Disposition: Rehab Facility [facility]    Final Active Diagnoses:    Diagnosis Date Noted POA    PRINCIPAL PROBLEM:  Embolic stroke involving right middle cerebral artery [I63.411] 10/12/2019 Yes    Internal carotid artery stenosis, bilateral [I65.23] 10/12/2019 Yes    Leukocytosis [D72.829] 10/16/2019 No    Essential hypertension [I10] 10/12/2019 Yes    Type 2 diabetes mellitus, without long-term current use of insulin [E11.9] 10/16/2019 Yes    Acute pain of left shoulder [M25.512] 10/14/2019 No    Mixed hyperlipidemia [E78.2] 10/12/2019 Yes    Cytotoxic cerebral edema [G93.6] 10/12/2019 Yes    Bilateral carotid artery stenosis [I65.23]  Unknown    Stroke [I63.9] 10/12/2019 Yes      Problems Resolved During this Admission:     * Embolic stroke involving right middle cerebral artery  58 yo woman with PMHx HTN, CAD, CHF, asthma who presented to [facility] with acute onset of L-sided weakness, slurred speech, and L facial droop. Telestroke completed an no tPA recommended as outside treatment window. Outside CTA then did not reveal an LVO so pt not a candidate for acute IR intervention, but had concerns for high-grade R ICA stenosis. Admitted for acute stroke workup.    MRI Brain demonstrating acute R MCA distribution infarct. Unclear source of lesion (outside CD) - MRA here showing what appears to be R ICA extracranial high-grade stenosis but also an abrupt lack of flow void in distal ICA/cavernous segment, suggesting possible thrombus, good flow from COW collaterals with additional R MCA M1 abrupt occlusion. VAS US Carotids concerning for R ICA occlusion with reconstituted flow as well as L ICA stenosis. Vascular Surgery saw the patient and is electing for medical management with ambulatory surveillance of her L ICA (see below.)  Overall, favor R ICA origin disease w/ artery to artery athero-embolism, though mild L atrial enlargement seen on  echo so cardioembolic source could be considered in the differential as well.      Antithrombotics for secondary stroke prevention: Antiplatelets: Aspirin: 81 mg daily  Clopidogrel: 75 mg daily  Statins for secondary stroke prevention and hyperlipidemia, if present:   Statins: Atorvastatin- 40 mg daily  Aggressive risk factor modification: HTN, HLD, Diet, Exercise  Rehab efforts: [facility] inpatient rehab  BP parameters: carotid stenosis: No restriction if asymptomatic     Internal carotid artery stenosis, bilateral  Stroke risk factor  CTA at outside facility reported R ICA high grade stenosis. As seen on MRA here as well.  VAS US Carotids 10/14 then with evidence of R ICA occlusion and L ICA stenosis.   Consulted Vascular Surgery; Appreciate recs. Plans for medical management, no acute intervention for now.  Ordered Ambulatory referral to Vascular Surgery at discharge for monitoring of her L ICA stenosis. Appt in 6 weeks for Carotid US.  Pt is with improving PO hydration to bolster BP for good cerebral perfusion in the setting of carotid stenosis.  DAPT + statin     Leukocytosis  WBC 14 > 10 > 14  Pt is afebrile, appears non-toxic, and denies any complaints currently.  Possible chronic process vs inflammatory response in the setting of acute stroke.  No indications for further evaluation or treatment at this time.  Can be further monitored at inpatient rehab    Essential hypertension  Stroke risk factor  SBP goal 160-180; BP initially borderline low but now improving  Encouraged patient to focus on increasing PO hydration to bolster BP for good cerebral perfusion. She conveyed understanding.  PCP follow-up    Type 2 diabetes mellitus, without long-term current use of insulin  Stroke risk factor  A1c 8.3% -- New diagnosis this admission  Recommend tight glucose control  BG was within goal range of 140-180 during admission; There was no need for SSI throughout admission.  Diabetic diet - Educated pt on need for  diet, lifestyle modifications.  Will defer to PCP for further work-up and treatment.    Acute pain of left shoulder  Pt slid off bedside commode 10/13 evening, c/o L shoulder pain 10/14 AM  XR 10/14 with no evidence of fracture or other acute process.   Tylenol PRN, ice packs    Cytotoxic cerebral edema  Area of cytotoxic cerebral edema identified when reviewing brain imaging in the territory of the R middle cerebral artery. There is not mass effect associated with it.   The patients clinical exam remained without any worsening of symptoms which may indicate expansion of the stroke or the area of the edema resulting in the clinical change. The pattern is suggestive of a large artery atheroembolic etiology.    Mixed hyperlipidemia  Stroke risk factor  LDL 71   Atorvastatin 40 mg       Recommendations:     Post-discharge complication risks: Falls, Pneumonia    Stroke Education given to: patient and family    Follow-up in Stroke Clinic in 4-8 weeks.     Discharge Plan:  Antithrombotics: Aspirin 81mg, Clopidogrel 75mg  Statin: Atorvastatin 40mg  Aggresive risk factor modification:  Hypertension  Diabetes  High Cholesterol  Diet  Exercise    Follow Up:  Follow-up Information     Dr. Velez In 1 week.    Why:  Call your GP to set up hospital follow-up within 1 week of discharge and for close management of stroke risk factors.           ALVARO Whelan Iii, MD In 6 weeks.    Specialty:  Vascular Surgery  Why:  With carotid US; Someone will call you to set up this appt.  Contact information:  [address]  [facility] [id number] [phone]             [facility] VASCULAR NEUROLOGY In 6 weeks.    Specialty:  Vascular Neurology  Why:  Someone from our office will call you to set up hospital follow-up in 4-8 weeks. If nobody calls within 1 week, call number above to schedule an appt.  Contact information:  [address]  [facility] [id number] [phone]                 Patient Instructions:       Ambulatory Referral to Vascular Surgery   Referral Priority: Routine Referral Type: Consultation   Referral Reason: Specialty Services Required   Referred to Provider: ALVARO DA SILVA III Requested Specialty: Vascular Surgery   Number of Visits Requested: 1       Medications:  Reconciled Home Medications:      Medication List      START taking these medications    acetaminophen 325 MG tablet  Commonly known as:  TYLENOL  Take 2 tablets (650 mg total) by mouth every 6 (six) hours as needed.     aspirin 81 MG EC tablet  Commonly known as:  ECOTRIN  Take 1 tablet (81 mg total) by mouth once daily.     atorvastatin 40 MG tablet  Commonly known as:  LIPITOR  Take 1 tablet (40 mg total) by mouth once daily.     clopidogrel 75 mg tablet  Commonly known as:  PLAVIX  Take 1 tablet (75 mg total) by mouth once daily.     pantoprazole 40 MG tablet  Commonly known as:  PROTONIX  Take 1 tablet (40 mg total) by mouth once daily.     ramelteon 8 mg tablet  Commonly known as:  ROZEREM  Take 1 tablet (8 mg total) by mouth nightly as needed for Insomnia.            Arcelia Bazzi PA-C  Comprehensive Stroke Center  Department of Vascular Neurology   [facility]

## 2019-10-17 NOTE — ASSESSMENT & PLAN NOTE
Area of cytotoxic cerebral edema identified when reviewing brain imaging in the territory of the R middle cerebral artery. There is not mass effect associated with it.   The patients clinical exam remained without any worsening of symptoms which may indicate expansion of the stroke or the area of the edema resulting in the clinical change. The pattern is suggestive of a large artery atheroembolic etiology.

## 2019-10-17 NOTE — ASSESSMENT & PLAN NOTE
Area of cytotoxic cerebral edema identified when reviewing brain imaging in the territory of the R middle cerebral artery. There is not mass effect associated with it.  The patients clinical exam remains without any worsening of symptoms which may indicate expansion of the stroke or the area of the edema resulting in the clinical change. The pattern is suggestive of a large artery atheroembolic etiology.

## 2019-10-17 NOTE — ASSESSMENT & PLAN NOTE
58 yo woman with PMHx HTN, CAD, CHF, asthma who presented to [facility] with acute onset of L-sided weakness, slurred speech, and L facial droop. Telestroke completed an no tPA recommended as outside treatment window. Outside CTA then did not reveal an LVO so pt not a candidate for acute IR intervention, but had concerns for high-grade R ICA stenosis. Admitted for acute stroke workup.    MRI Brain demonstrating acute R MCA distribution infarct. Unclear source of lesion (outside CD) - MRA here showing what appears to be R ICA extracranial high-grade stenosis but also an abrupt lack of flow void in distal ICA/cavernous segment, suggesting possible thrombus, good flow from COW collaterals with additional R MCA M1 abrupt occlusion. VAS US Carotids concerning for R ICA occlusion with reconstituted flow as well as L ICA stenosis. Vascular Surgery saw the patient and is electing for medical management with ambulatory surveillance of her L ICA (see below.)  Overall, favor R ICA origin disease w/ artery to artery athero-embolism, though mild L atrial enlargement seen on echo so cardioembolic source could be considered in the differential as well.    Pt remains medically and neurologically stable; d/c to inpatient rehab today.      Antithrombotics for secondary stroke prevention: Antiplatelets: Aspirin: 81 mg daily  Clopidogrel: 75 mg daily  Statins for secondary stroke prevention and hyperlipidemia, if present:   Statins: Atorvastatin- 40 mg daily  Aggressive risk factor modification: HTN, HLD, Diet, Exercise  Rehab efforts: The patient has been evaluated by a stroke team provider and the therapy needs have been fully considered based off the presenting complaints and exam findings. The following therapy evaluations are needed: PT evaluate and treat, OT evaluate and treat, SLP evaluate and treat, PM&R evaluate for appropriate placement - Dispo rehab  Diagnostics ordered/pending: None   VTE prophylaxis: Heparin 5000 units SQ every 8  hours and mechanical SCDs  BP parameters: carotid stenosis: No restriction if asymptomatic

## 2019-10-17 NOTE — SUBJECTIVE & OBJECTIVE
Neurologic Chief Complaint: Left-sided weakness, slurred speech, L facial droop    Subjective:     Interval History: Patient is seen for follow-up neurological assessment and treatment recommendations:   NAEON. Mg 1.5; ordered IV replacement. Pt remains medically and neurologically stable. Plan for d/c to inpatient rehab today.    HPI, Past Medical, Family, and Social History remains the same as documented in the initial encounter.     Review of Systems   Constitutional: Negative for chills and fever.   Gastrointestinal: Negative for nausea and vomiting.   Neurological: Positive for facial asymmetry, speech difficulty, weakness and numbness.   Psychiatric/Behavioral: Negative for agitation and confusion.     Scheduled Meds:    Continuous Infusions:    PRN Meds:    Objective:     Vital Signs (Most Recent):  Temp: 97.9 °F (36.6 °C) (10/16/19 1110)  Pulse: 82 (10/16/19 1110)  Resp: 16 (10/16/19 1110)  BP: 121/79 (10/16/19 1110)  SpO2: 96 % (10/16/19 1110)  BP Location: Left arm    Vital Signs Range (Last 24H):  Temp:  [97.7 °F (36.5 °C)-98.6 °F (37 °C)]   Pulse:  [82-94]   Resp:  [16-18]   BP: (121-145)/(75-91)   SpO2:  [93 %-97 %]   BP Location: Left arm    Physical Exam   Constitutional: She is oriented to person, place, and time. She appears well-developed and well-nourished. No distress.   HENT:   Head: Normocephalic and atraumatic.   Eyes: Conjunctivae are normal. No scleral icterus.   Cardiovascular: Normal rate.   Pulmonary/Chest: Effort normal. No respiratory distress.   Musculoskeletal: She exhibits no edema or deformity.   Neurological: She is alert and oriented to person, place, and time. A sensory deficit is present.   Skin: Skin is warm and dry.   Psychiatric: She has a normal mood and affect. Her behavior is normal. Cognition and memory are not impaired. She is attentive.   Vitals reviewed.      Neurological Exam:   LOC: alert  Attention Span: Good   Language: No aphasia  Articulation:  Dysarthria  Orientation: Person, Place, Time   Visual Fields: Full  EOM (CN III, IV, VI): Gaze preference  right  Facial Movement (CN VII): Lower facial weakness on the Left  Motor: Arm left  Paresis: 3/5  Leg left  Paresis: 4/5  Arm right  Normal 5/5  Leg right Normal 5/5  Sensation: Carlos-hypoesthesia left  Tone: Normal tone throughout    Laboratory:  CMP:   Recent Labs   Lab 10/16/19  0516   CALCIUM 9.0      K 3.8   CO2 22*      BUN 10   CREATININE 0.8     BMP:   Recent Labs   Lab 10/16/19  0516      K 3.8      CO2 22*   BUN 10   CREATININE 0.8   CALCIUM 9.0       Diagnostic Results     Brain imaging:     MRI Brain WO Contrast 10/13/19    Acute right MCA distribution infarction predominantly involving the right basal ganglia.  No evidence of hemorrhagic conversion.  Partially absent right ICA and MCA flow voids likely representing vascular occlusions corresponding to abnormal findings on MRA reported separately.      CT Head 10/12/19  Hypoattenuation in a right MCA distribution with loss of gray-white differentiation and hyperdense MCA sign concerning for acute stroke with right MCA thrombus.      Vessel Imaging:    VAS US Carotids MACI 10/14/19  Impression:   RIGHT SIDE:   The proximal right ICA appears to be occluded. Retrograde branches are noted reconstituting flow into the mid/distal ICA.   Calcification of the right internal carotid artery.   Heterogeneous plaque in the right common carotid artery, which appears to have blunted flow.   Antegrade flow in the right vertebral artery.  LEFT SIDE:  60-79% Left ICA stenosis.  Calcification of the left internal carotid artery.  Heterogeneous plaque in the left common carotid artery.   Antegrade flow in the left vertebral artery.    MRA Brain/Neck 10/13/19  Marked narrowing of the visualized right internal carotid artery through the skull base with occlusion of the mid right M1 segment of the MCA.  The right common carotid and internal carotid  artery are diminutive with minimal flow throughout. There may be segmental sections of trickle flow. The right internal carotid artery is quite diminutive at the entrance to the skull base and cavernous sinus. The appearance suggest diffuse disease potential with thrombosis.  No definite findings for dissection.  The left carotid and bilateral vertebral arteries appear normal.    CTA Head/Neck at outside facility   R ICA stenosis 99%  L ICA stenosis 70%       Cardiac Evaluation:     TTE 10/13/19  · Normal left ventricular systolic function. The estimated ejection fraction is 65%.  · Normal right ventricular systolic function.  · Indeterminate left ventricular diastolic function.  · Mild left atrial enlargement.  · No wall motion abnormalities.

## 2019-10-17 NOTE — ASSESSMENT & PLAN NOTE
Pt slid off bedside commode 10/13 evening, c/o L shoulder pain 10/14 AM  XR 10/14 with no evidence of fracture or other acute process.   Tylenol PRN, ice packs

## 2019-10-17 NOTE — PROGRESS NOTES
[facility]  Vascular Neurology  Comprehensive Stroke Center  Progress Note    Assessment/Plan:     * Embolic stroke involving right middle cerebral artery  60 yo woman with PMHx HTN, CAD, CHF, asthma who presented to [facility] with acute onset of L-sided weakness, slurred speech, and L facial droop. Telestroke completed an no tPA recommended as outside treatment window. Outside CTA then did not reveal an LVO so pt not a candidate for acute IR intervention, but had concerns for high-grade R ICA stenosis. Admitted for acute stroke workup.    MRI Brain demonstrating acute R MCA distribution infarct. Unclear source of lesion (outside CD) - MRA here showing what appears to be R ICA extracranial high-grade stenosis but also an abrupt lack of flow void in distal ICA/cavernous segment, suggesting possible thrombus, good flow from COW collaterals with additional R MCA M1 abrupt occlusion. VAS US Carotids concerning for R ICA occlusion with reconstituted flow as well as L ICA stenosis. Vascular Surgery saw the patient and is electing for medical management with ambulatory surveillance of her L ICA (see below.)  Overall, favor R ICA origin disease w/ artery to artery athero-embolism, though mild L atrial enlargement seen on echo so cardioembolic source could be considered in the differential as well.    Pt remains medically and neurologically stable; d/c to inpatient rehab today.      Antithrombotics for secondary stroke prevention: Antiplatelets: Aspirin: 81 mg daily  Clopidogrel: 75 mg daily  Statins for secondary stroke prevention and hyperlipidemia, if present:   Statins: Atorvastatin- 40 mg daily  Aggressive risk factor modification: HTN, HLD, Diet, Exercise  Rehab efforts: The patient has been evaluated by a stroke team provider and the therapy needs have been fully considered based off the presenting complaints and exam findings. The following therapy evaluations are needed: PT evaluate and treat, OT  evaluate and treat, SLP evaluate and treat, PM&R evaluate for appropriate placement - Dispo rehab  Diagnostics ordered/pending: None   VTE prophylaxis: Heparin 5000 units SQ every 8 hours and mechanical SCDs  BP parameters: carotid stenosis: No restriction if asymptomatic     Internal carotid artery stenosis, bilateral  Stroke risk factor  CTA at outside facility reported R ICA high grade stenosis. As seen on MRA here as well.  VAS US Carotids 10/14 then with evidence of R ICA occlusion and L ICA stenosis.   Consulted Vascular Surgery; Appreciate recs. Plans for medical management, no acute intervention for now.  Ordered Ambulatory referral to Vascular Surgery at discharge for monitoring of her L ICA stenosis. Appt in 6 weeks for Carotid US.  Pt is with improving PO hydration to bolster BP for good cerebral perfusion in the setting of carotid stenosis.  DAPT + statin     Leukocytosis  WBC 14 > 10 > 14  Pt is afebrile, appears non-toxic, and denies any complaints currently.  Possible chronic process vs inflammatory response in the setting of acute stroke.  No indications for further evaluation or treatment at this time.  Can be further monitored at inpatient rehab    Essential hypertension  Stroke risk factor  SBP goal 160-180; BP initially borderline low but now improving  Encouraged patient to focus on increasing PO hydration to bolster BP for good cerebral perfusion. She conveyed understanding.  PCP follow-up    Type 2 diabetes mellitus, without long-term current use of insulin  Stroke risk factor  A1c 8.3% -- New diagnosis this admission  Recommend tight glucose control  BG was within goal range of 140-180 during admission; There was no need for SSI throughout admission.  Diabetic diet - Educated pt on need for diet, lifestyle modifications.  Will defer to PCP for further work-up and treatment.    Acute pain of left shoulder  Pt slid off bedside commode 10/13 evening, c/o L shoulder pain 10/14 AM  XR 10/14 with  no evidence of fracture or other acute process.   Tylenol PRN, ice packs    Cytotoxic cerebral edema  Area of cytotoxic cerebral edema identified when reviewing brain imaging in the territory of the R middle cerebral artery. There is not mass effect associated with it.  The patients clinical exam remains without any worsening of symptoms which may indicate expansion of the stroke or the area of the edema resulting in the clinical change. The pattern is suggestive of a large artery atheroembolic etiology.    Mixed hyperlipidemia  Stroke risk factor  LDL 71   Atorvastatin 40 mg          10/12 - R MCA stroke. CTA at outside hospital with R ICA stenosis. Transferred for higher level of care.   10/13 - MRI/MRA with R M1 occlusion, no interventions out of window. Exam improved, NIH from 12 to 6. US carotids ordered. UA WBCs 7, pt denies symptoms of UTI, a febrile. SLP cleared for mechanical soft diet. Therapy recommending rehab.   10/14: Pt slid off bedside commode yesterday evening, c/o L shoulder pain this AM; XR with no acute process. VAS US Carotids with R ICA occlusion and L ICA stenosis; discussed consult with Vascular Surgery team. Started IVFs to bolster BP for good cerebral perfusion. PRN Tylenol for HA. Dispo rehab.  10/15: Vascular Surgery with no plans for acute intervention; will follow the patient in clinic. Continuing IVFs today but encouraged patient to focus on increasing PO hydration. Ordered PRN Ramelteon per pt request. Plans for d/c to inpatient rehab in the morning.  10/16: Mg 1.5; ordered IV replacement. Pt remains medically and neurologically stable. Plan for d/c to inpatient rehab today.    STROKE DOCUMENTATION   Acute Stroke Times   Last Known Normal Date: 10/12/19  Last Known Normal Time: 0730  Symptom Onset Date: 10/12/19  Symptom Onset Time: 0730  Stroke Team Called Date: 10/12/19  Stroke Team Called Time: 1930  Stroke Team Arrival Date: 10/12/19  Stroke Team Arrival Time: 1935  CT  Interpretation Time: (NA)  Decision to Treat Time for Alteplase: (NA)  Decision to Treat Time for IR: (NA)    NIH Scale:  1a. Level of Consciousness: 0-->Alert, keenly responsive  1b. LOC Questions: 0-->Answers both questions correctly  1c. LOC Commands: 0-->Performs both tasks correctly  2. Best Gaze: 1-->Partial gaze palsy, gaze is abnormal in one or both eyes, but forced deviation or total gaze paresis is not present  3. Visual: 0-->No visual loss  4. Facial Palsy: 2-->Partial paralysis (total or near-total paralysis of lower face)  5a. Motor Arm, Left: 1-->Drift, limb holds 90 (or 45) degrees, but drifts down before full 10 seconds, does not hit bed or other support  5b. Motor Arm, Right: 0-->No drift, limb holds 90 (or 45) degrees for full 10 secs  6a. Motor Leg, Left: 1-->Drift, leg falls by the end of the 5-sec period but does not hit bed  6b. Motor Leg, Right: 0-->No drift, leg holds 30 degree position for full 5 secs  7. Limb Ataxia: 0-->Absent  8. Sensory: 1-->Mild-to-moderate sensory loss, patient feels pinprick is less sharp or is dull on the affected side, or there is a loss of superficial pain with pinprick, but patient is aware of being touched  9. Best Language: 0-->No aphasia, normal  10. Dysarthria: 1-->Mild-to-moderate dysarthria, patient slurs at least some words and, at worst, can be understood with some difficulty  11. Extinction and Inattention (formerly Neglect): 0-->No abnormality  Total (NIH Stroke Scale): 7       Modified [place] Score: 3  [place] Coma Scale:    ABCD2 Score:    [id number] Score:   HAS -BLED Score:   ICH Score:   Hunt & Prakash Classification:      Hemorrhagic change of an Ischemic Stroke: Does this patient have an ischemic stroke with hemorrhagic changes? No     Neurologic Chief Complaint: Left-sided weakness, slurred speech, L facial droop    Subjective:     Interval History: Patient is seen for follow-up neurological assessment and treatment recommendations:   ARYAN Rosales  1.5; ordered IV replacement. Pt remains medically and neurologically stable. Plan for d/c to inpatient rehab today.    HPI, Past Medical, Family, and Social History remains the same as documented in the initial encounter.     Review of Systems   Constitutional: Negative for chills and fever.   Gastrointestinal: Negative for nausea and vomiting.   Neurological: Positive for facial asymmetry, speech difficulty, weakness and numbness.   Psychiatric/Behavioral: Negative for agitation and confusion.     Scheduled Meds:    Continuous Infusions:    PRN Meds:    Objective:     Vital Signs (Most Recent):  Temp: 97.9 °F (36.6 °C) (10/16/19 1110)  Pulse: 82 (10/16/19 1110)  Resp: 16 (10/16/19 1110)  BP: 121/79 (10/16/19 1110)  SpO2: 96 % (10/16/19 1110)  BP Location: Left arm    Vital Signs Range (Last 24H):  Temp:  [97.7 °F (36.5 °C)-98.6 °F (37 °C)]   Pulse:  [82-94]   Resp:  [16-18]   BP: (121-145)/(75-91)   SpO2:  [93 %-97 %]   BP Location: Left arm    Physical Exam   Constitutional: She is oriented to person, place, and time. She appears well-developed and well-nourished. No distress.   HENT:   Head: Normocephalic and atraumatic.   Eyes: Conjunctivae are normal. No scleral icterus.   Cardiovascular: Normal rate.   Pulmonary/Chest: Effort normal. No respiratory distress.   Musculoskeletal: She exhibits no edema or deformity.   Neurological: She is alert and oriented to person, place, and time. A sensory deficit is present.   Skin: Skin is warm and dry.   Psychiatric: She has a normal mood and affect. Her behavior is normal. Cognition and memory are not impaired. She is attentive.   Vitals reviewed.      Neurological Exam:   LOC: alert  Attention Span: Good   Language: No aphasia  Articulation: Dysarthria  Orientation: Person, Place, Time   Visual Fields: Full  EOM (CN III, IV, VI): Gaze preference  right  Facial Movement (CN VII): Lower facial weakness on the Left  Motor: Arm left  Paresis: 3/5  Leg left  Paresis: 4/5  Arm  right  Normal 5/5  Leg right Normal 5/5  Sensation: Carlos-hypoesthesia left  Tone: Normal tone throughout    Laboratory:  CMP:   Recent Labs   Lab 10/16/19  0516   CALCIUM 9.0      K 3.8   CO2 22*      BUN 10   CREATININE 0.8     BMP:   Recent Labs   Lab 10/16/19  0516      K 3.8      CO2 22*   BUN 10   CREATININE 0.8   CALCIUM 9.0       Diagnostic Results     Brain imaging:     MRI Brain WO Contrast 10/13/19    Acute right MCA distribution infarction predominantly involving the right basal ganglia.  No evidence of hemorrhagic conversion.  Partially absent right ICA and MCA flow voids likely representing vascular occlusions corresponding to abnormal findings on MRA reported separately.      CT Head 10/12/19  Hypoattenuation in a right MCA distribution with loss of gray-white differentiation and hyperdense MCA sign concerning for acute stroke with right MCA thrombus.      Vessel Imaging:    VAS US Carotids MACI 10/14/19  Impression:   RIGHT SIDE:   The proximal right ICA appears to be occluded. Retrograde branches are noted reconstituting flow into the mid/distal ICA.   Calcification of the right internal carotid artery.   Heterogeneous plaque in the right common carotid artery, which appears to have blunted flow.   Antegrade flow in the right vertebral artery.  LEFT SIDE:  60-79% Left ICA stenosis.  Calcification of the left internal carotid artery.  Heterogeneous plaque in the left common carotid artery.   Antegrade flow in the left vertebral artery.    MRA Brain/Neck 10/13/19  Marked narrowing of the visualized right internal carotid artery through the skull base with occlusion of the mid right M1 segment of the MCA.  The right common carotid and internal carotid artery are diminutive with minimal flow throughout. There may be segmental sections of trickle flow. The right internal carotid artery is quite diminutive at the entrance to the skull base and cavernous sinus. The appearance suggest  diffuse disease potential with thrombosis.  No definite findings for dissection.  The left carotid and bilateral vertebral arteries appear normal.    CTA Head/Neck at outside facility   R ICA stenosis 99%  L ICA stenosis 70%       Cardiac Evaluation:     TTE 10/13/19  · Normal left ventricular systolic function. The estimated ejection fraction is 65%.  · Normal right ventricular systolic function.  · Indeterminate left ventricular diastolic function.  · Mild left atrial enlargement.  · No wall motion abnormalities.      Arcelia Bazzi PA-C  Comprehensive Stroke Center  Department of Vascular Neurology   [facility]

## 2019-11-05 ENCOUNTER — OFFICE VISIT (OUTPATIENT)
Dept: VASCULAR SURGERY | Facility: CLINIC | Age: 59
End: 2019-11-05
Payer: COMMERCIAL

## 2019-11-05 VITALS
HEIGHT: 64 IN | TEMPERATURE: 99 F | SYSTOLIC BLOOD PRESSURE: 140 MMHG | DIASTOLIC BLOOD PRESSURE: 92 MMHG | HEART RATE: 89 BPM | BODY MASS INDEX: 26.34 KG/M2 | WEIGHT: 154.31 LBS

## 2019-11-05 DIAGNOSIS — I65.23 BILATERAL CAROTID ARTERY STENOSIS: Primary | ICD-10-CM

## 2019-11-05 PROCEDURE — 99213 OFFICE O/P EST LOW 20 MIN: CPT | Mod: S$GLB,,, | Performed by: SURGERY

## 2019-11-05 PROCEDURE — 99213 PR OFFICE/OUTPT VISIT, EST, LEVL III, 20-29 MIN: ICD-10-PCS | Mod: S$GLB,,, | Performed by: SURGERY

## 2019-11-05 PROCEDURE — 3008F BODY MASS INDEX DOCD: CPT | Mod: CPTII,S$GLB,, | Performed by: SURGERY

## 2019-11-05 PROCEDURE — 99999 PR PBB SHADOW E&M-EST. PATIENT-LVL III: ICD-10-PCS | Mod: PBBFAC,,, | Performed by: SURGERY

## 2019-11-05 PROCEDURE — 3008F PR BODY MASS INDEX (BMI) DOCUMENTED: ICD-10-PCS | Mod: CPTII,S$GLB,, | Performed by: SURGERY

## 2019-11-05 PROCEDURE — 99999 PR PBB SHADOW E&M-EST. PATIENT-LVL III: CPT | Mod: PBBFAC,,, | Performed by: SURGERY

## 2019-11-05 RX ORDER — ALBUTEROL SULFATE 0.83 MG/ML
2.5 SOLUTION RESPIRATORY (INHALATION) EVERY 4 HOURS PRN
Refills: 1 | COMMUNITY
Start: 2019-10-03

## 2019-11-05 RX ORDER — METOPROLOL TARTRATE 25 MG/1
25 TABLET, FILM COATED ORAL
COMMUNITY
Start: 2019-11-04

## 2019-11-05 NOTE — PROGRESS NOTES
See my prior inpatient note; review of systems, family history, and social   history are unchanged.    HISTORY OF PRESENT ILLNESS:  A 59-year-old female who had presented with a right   hemispheric stroke and a right ICA occlusion, initial NIH stroke scale of 12,   who was treated with medical therapy.  She is also known to have an M1 occlusion   on the right by MRA.  She was just discharged from the hospital yesterday.    Overall, she says that she is improving, although she still has some residual   weakness in her left arm and left leg.  She can walk with a walker, but not   unassisted.    MEDICATIONS:  Include aspirin, Plavix, and statin.  See EPIC for a full list.    PHYSICAL EXAMINATION:  VITAL SIGNS:  See nursing notes.  NEUROLOGIC:  Cranial nerves VII through XII are intact.  Left arm and leg have   4/5 motor strength.    IMAGING:  Carotid duplex, 10/14/2019, showed a right ICA occlusion and a left   60% to 70% carotid stenosis, with peak systolic velocity of 169 and diastolic of   77.    ASSESSMENT:  1.  Right ICA occlusion with recent R hemispheric stroke.  She still has a left-sided deficit,   but this is still improving.  2.  Left 60% to 70% carotid stenosis, neurologically asymptomatic.  Medical   treatment is most appropriate.    RECOMMENDATIONS:  1.  Continue dual antiplatelet therapy and statin.  2.  Follow up in six months for the screening carotid duplex and if clinically   indicated.      YUMIKO  dd: 11/05/2019 10:59:28 (CST)  td: 11/05/2019 18:28:05 (CST)  Doc ID   #[id number]  Job ID #[id number]    CC:

## 2019-11-05 NOTE — LETTER
[facility]  [address]  [place] [address]  Phone: [phone]  Fax: [phone] November 5, 2019      Arcelia Bazzi PA-C  [address]  [facility] [address]    Patient: Karen Ellison   MR Number: [id number]   YOB: 1960   Date of Visit: 11/5/2019       Dear Arcelia Bazzi:    Thank you for referring Karen Ellison to me for evaluation. Attached you will find relevant portions of my assessment and plan of care.    If you have questions, please do not hesitate to call me. I look forward to following Karen Ellison along with you.    Sincerely,        MD JACINDA Drake III/etb    Enclosure

## 2020-01-09 ENCOUNTER — OFFICE VISIT (OUTPATIENT)
Dept: NEUROLOGY | Facility: CLINIC | Age: 60
End: 2020-01-09
Payer: COMMERCIAL

## 2020-01-09 VITALS
SYSTOLIC BLOOD PRESSURE: 120 MMHG | DIASTOLIC BLOOD PRESSURE: 86 MMHG | HEART RATE: 107 BPM | HEIGHT: 64 IN | BODY MASS INDEX: 26.49 KG/M2

## 2020-01-09 DIAGNOSIS — E11.49 DM (DIABETES MELLITUS), TYPE 2 WITH NEUROLOGICAL COMPLICATIONS: ICD-10-CM

## 2020-01-09 DIAGNOSIS — I10 ESSENTIAL HYPERTENSION: ICD-10-CM

## 2020-01-09 DIAGNOSIS — I65.23 BILATERAL CAROTID ARTERY STENOSIS: ICD-10-CM

## 2020-01-09 DIAGNOSIS — E78.2 MIXED HYPERLIPIDEMIA: ICD-10-CM

## 2020-01-09 DIAGNOSIS — Z86.73 HISTORY OF STROKE: Primary | ICD-10-CM

## 2020-01-09 PROBLEM — I63.9 STROKE: Status: RESOLVED | Noted: 2019-10-12 | Resolved: 2020-01-09

## 2020-01-09 PROBLEM — G93.6 CYTOTOXIC CEREBRAL EDEMA: Status: RESOLVED | Noted: 2019-10-12 | Resolved: 2020-01-09

## 2020-01-09 PROCEDURE — 99999 PR PBB SHADOW E&M-EST. PATIENT-LVL III: ICD-10-PCS | Mod: PBBFAC,,, | Performed by: PSYCHIATRY & NEUROLOGY

## 2020-01-09 PROCEDURE — 3008F PR BODY MASS INDEX (BMI) DOCUMENTED: ICD-10-PCS | Mod: CPTII,S$GLB,, | Performed by: PSYCHIATRY & NEUROLOGY

## 2020-01-09 PROCEDURE — 3008F BODY MASS INDEX DOCD: CPT | Mod: CPTII,S$GLB,, | Performed by: PSYCHIATRY & NEUROLOGY

## 2020-01-09 PROCEDURE — 99214 PR OFFICE/OUTPT VISIT, EST, LEVL IV, 30-39 MIN: ICD-10-PCS | Mod: S$GLB,,, | Performed by: PSYCHIATRY & NEUROLOGY

## 2020-01-09 PROCEDURE — 3074F SYST BP LT 130 MM HG: CPT | Mod: CPTII,S$GLB,, | Performed by: PSYCHIATRY & NEUROLOGY

## 2020-01-09 PROCEDURE — 99999 PR PBB SHADOW E&M-EST. PATIENT-LVL III: CPT | Mod: PBBFAC,,, | Performed by: PSYCHIATRY & NEUROLOGY

## 2020-01-09 PROCEDURE — 3079F DIAST BP 80-89 MM HG: CPT | Mod: CPTII,S$GLB,, | Performed by: PSYCHIATRY & NEUROLOGY

## 2020-01-09 PROCEDURE — 99214 OFFICE O/P EST MOD 30 MIN: CPT | Mod: S$GLB,,, | Performed by: PSYCHIATRY & NEUROLOGY

## 2020-01-09 PROCEDURE — 3079F PR MOST RECENT DIASTOLIC BLOOD PRESSURE 80-89 MM HG: ICD-10-PCS | Mod: CPTII,S$GLB,, | Performed by: PSYCHIATRY & NEUROLOGY

## 2020-01-09 PROCEDURE — 3074F PR MOST RECENT SYSTOLIC BLOOD PRESSURE < 130 MM HG: ICD-10-PCS | Mod: CPTII,S$GLB,, | Performed by: PSYCHIATRY & NEUROLOGY

## 2020-01-09 RX ORDER — OLMESARTAN MEDOXOMIL AND HYDROCHLOROTHIAZIDE 20/12.5 20; 12.5 MG/1; MG/1
TABLET ORAL
COMMUNITY

## 2020-01-09 NOTE — PROGRESS NOTES
Vascular Neurology  Clinic Note    ___________________  ASSESSMENT & PLAN    Problem List Items Addressed This Visit        1 - High    History of stroke - Primary    Current Assessment & Plan     Etiology: Large Artery Atherosclerosis Evident  PAOLA Major: R ICA occluded, L ICA 70% -- CE Absent --  Absent -- Other Major: Radiation exposure , unclear if contributor   Seen by Tip, plan on f/u US in 2 months   · Diagnostic Orders: none; f/u US carotid per Vascular SUrgery  · Secondary stroke prevention: Continue DAPT indefinitely  · Continue current statin therapy , goal LDL < 70  · Blood pressure goal < 130/80 mmHg, would not overcorrect   · Stroke Risk Factors Addressed: DM (new diagnosis, needs to start on oral agents amongst other basic DM care via PCP in [place]), HTN, HLD, radiation exposure  · Stroke education administered           Relevant Orders    Ambulatory consult to Occupational Therapy       Unprioritized    Essential hypertension    Mixed hyperlipidemia    Bilateral carotid artery stenosis    DM (diabetes mellitus), type 2 with neurological complications    Current Assessment & Plan     Will contact PCP regarding initiation of metformin or other oral agent, dietary recommendations, etc as well as glucose monitoring.   Lives in [place] and drives 2.5 hours, primary care in [place].               Reason For Visit (Chief Complaint): R MCA stroke, admitted 10/2019    HPI: 59 y.o. right handed female with acute onset left sided weakness, seen as telestroke consult and eventually transferred to [facility], no tpa given.  The aforementioned symptoms have never happened prior to the event. There are no identified triggers or modifying factors. There have been no recurrent events. There are no other associated symptoms.  Found to have R ICA occlusion and high grade contralateral stenosis to be followed by Vascular Surgery as outpatient which occurred on 2019 and the plan was continued follow up for her L ICA  60-70% stenosis and continue medical management.    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 Family history of atherosclerotic disease. Remote smoker.    Brain Imaging:  MRI Brain WO Contrast 10/13/19    Acute right MCA distribution infarction predominantly involving the right basal ganglia.  No evidence of hemorrhagic conversion.  Partially absent right ICA and MCA flow voids likely representing vascular occlusions corresponding to abnormal findings on MRA reported separately.       Vessel Imaging:     VAS US Carotids MACI 10/14/19  Impression:   RIGHT SIDE:   The proximal right ICA appears to be occluded. Retrograde branches are noted reconstituting flow into the mid/distal ICA.   Calcification of the right internal carotid artery.   Heterogeneous plaque in the right common carotid artery, which appears to have blunted flow.   Antegrade flow in the right vertebral artery.  LEFT SIDE:  60-79% Left ICA stenosis.  Calcification of the left internal carotid artery.  Heterogeneous plaque in the left common carotid artery.   Antegrade flow in the left vertebral artery.     MRA Brain/Neck 10/13/19  Marked narrowing of the visualized right internal carotid artery through the skull base with occlusion of the mid right M1 segment of the MCA.  The right common carotid and internal carotid artery are diminutive with minimal flow throughout. There may be segmental sections of trickle flow. The right internal carotid artery is quite diminutive at the entrance to the skull base and cavernous sinus. The appearance suggest diffuse disease potential with thrombosis.  No definite findings for dissection.  The left carotid and bilateral vertebral arteries appear normal.     CTA Head/Neck at outside facility   R ICA stenosis 99%  L ICA stenosis 70%        Cardiac Evaluation:      TTE 10/13/19  · Normal left ventricular systolic function. The estimated  "ejection fraction is 65%.  · Normal right ventricular systolic function.  · Indeterminate left ventricular diastolic function.  · Mild left atrial enlargement.  · No wall motion abnormalities.    Other:     Relevant Labwork:  Recent Labs   Lab 10/12/19  2042   Hemoglobin A1C 8.3 H   LDL Cholesterol 71.0   HDL 39 L   Triglycerides 165 H   Cholesterol 143       I independently viewed the above imaging studies in addition to reviewing the report.  I reviewed the above labwork.    Review of Systems  Msk: negative for muscle pain  Skin: negative for pruritis  Neuro: negative for headache  All others negative    Past Medical History  Past Medical History:   Diagnosis Date    Coronary artery disease     Hypertension      Family History  Relevant history: heart disease and strokes early   Social History  Former Smoker     Medication List with Changes/Refills   Current Medications    ALBUTEROL (PROVENTIL) 2.5 MG /3 ML (0.083 %) NEBULIZER SOLUTION    Take 2.5 mg by nebulization every 4 (four) hours as needed.    ASPIRIN (ECOTRIN) 81 MG EC TABLET    Take 1 tablet (81 mg total) by mouth once daily.    ATORVASTATIN (LIPITOR) 40 MG TABLET    Take 1 tablet (40 mg total) by mouth once daily.    CLOPIDOGREL (PLAVIX) 75 MG TABLET    Take 1 tablet (75 mg total) by mouth once daily.    METOPROLOL TARTRATE (LOPRESSOR) 25 MG TABLET    Take 25 mg by mouth.    OLMESARTAN-HYDROCHLOROTHIAZIDE (BENICAR HCT) 20-12.5 MG PER TABLET    olmesartan 20 mg-hydrochlorothiazide 12.5 mg tablet   Take 1 tablet every day by oral route.    PANTOPRAZOLE (PROTONIX) 40 MG TABLET    Take 1 tablet (40 mg total) by mouth once daily.       EXAM  Vital Signs:  Vitals - 1 value per visit 10/12/2019 10/13/2019 10/16/2019 11/5/2019 1/9/2020   SYSTOLIC - - 121 140 120   DIASTOLIC - - 79 92 86   PULSE - - 82 89 107   TEMPERATURE - - 97.9 98.5 -   RESPIRATIONS - - 16 - -   SPO2 - - 96 - -   Weight (lb) - 154 - 154.32 -   Weight (kg) - 69.854 - 70 -   HEIGHT - 5' 5" - " "5' 4" 5' 4"   BODY MASS INDEX 25.63 - - 26.49 26.49   VISIT REPORT - - - - -   Pain Score  - - - 3 0       General: well appearing without discomfort   Mental Status:alert, oriented to person - place - age - month   Language: able to name, repeat, comprehend commands   Cranial Nerves: EOMI, PERRL,left facial weakness, tongue to midline, palate midline  Motor: 4+/5 extensor L UE, flexor L LE  Sensory: intact light touch bilaterally, intact proprioception bilaterally  Coordination: no ataxia on finger-to-nose or heel-to-shin testing; no truncal ataxia    Stroke Scales      MD Mandeep  Vascular Neurology  Office [phone]  Fax [phone]  "

## 2020-01-09 NOTE — ASSESSMENT & PLAN NOTE
Etiology: Large Artery Atherosclerosis Evident  PAOLA Major: R ICA occluded, L ICA 70% -- CE Absent --  Absent -- Other Major: Radiation exposure , unclear if contributor   Seen by Tip, plan on f/u US in 2 months   · Diagnostic Orders: none; f/u US carotid per Vascular SUrgery  · Secondary stroke prevention: Continue DAPT indefinitely  · Continue current statin therapy , goal LDL < 70  · Blood pressure goal < 130/80 mmHg, would not overcorrect   · Stroke Risk Factors Addressed: DM (new diagnosis, needs to start on oral agents amongst other basic DM care via PCP in [place]), HTN, HLD, radiation exposure  · Stroke education administered

## 2020-01-09 NOTE — ASSESSMENT & PLAN NOTE
Will contact PCP regarding initiation of metformin or other oral agent, dietary recommendations, etc as well as glucose monitoring.   Lives in [place] and drives 2.5 hours, primary care in [place].